# Patient Record
Sex: MALE | Race: WHITE | Employment: OTHER | URBAN - METROPOLITAN AREA
[De-identification: names, ages, dates, MRNs, and addresses within clinical notes are randomized per-mention and may not be internally consistent; named-entity substitution may affect disease eponyms.]

---

## 2018-04-20 ENCOUNTER — OFFICE VISIT (OUTPATIENT)
Dept: FAMILY MEDICINE CLINIC | Age: 68
End: 2018-04-20

## 2018-04-20 VITALS
WEIGHT: 213 LBS | HEIGHT: 75 IN | HEART RATE: 85 BPM | OXYGEN SATURATION: 98 % | TEMPERATURE: 97.6 F | RESPIRATION RATE: 18 BRPM | BODY MASS INDEX: 26.49 KG/M2 | SYSTOLIC BLOOD PRESSURE: 128 MMHG | DIASTOLIC BLOOD PRESSURE: 83 MMHG

## 2018-04-20 DIAGNOSIS — R27.0 ATAXIA: ICD-10-CM

## 2018-04-20 DIAGNOSIS — Z00.00 WELL ADULT EXAM: Primary | ICD-10-CM

## 2018-04-20 PROBLEM — E78.49 OTHER HYPERLIPIDEMIA: Status: ACTIVE | Noted: 2018-04-20

## 2018-04-20 PROBLEM — I10 ESSENTIAL HYPERTENSION: Status: ACTIVE | Noted: 2018-04-20

## 2018-04-20 PROBLEM — I48.20 CHRONIC ATRIAL FIBRILLATION (HCC): Status: ACTIVE | Noted: 2018-04-20

## 2018-04-20 RX ORDER — ALBUTEROL SULFATE 90 UG/1
2 AEROSOL, METERED RESPIRATORY (INHALATION)
COMMUNITY

## 2018-04-20 RX ORDER — PRAVASTATIN SODIUM 10 MG/1
TABLET ORAL
COMMUNITY
End: 2019-01-09 | Stop reason: SDUPTHER

## 2018-04-20 RX ORDER — IPRATROPIUM BROMIDE AND ALBUTEROL SULFATE 2.5; .5 MG/3ML; MG/3ML
3 SOLUTION RESPIRATORY (INHALATION)
COMMUNITY

## 2018-04-20 RX ORDER — METOPROLOL TARTRATE 100 MG/1
100 TABLET ORAL DAILY
COMMUNITY

## 2018-04-20 RX ORDER — ASPIRIN 81 MG/1
TABLET ORAL DAILY
COMMUNITY
End: 2018-12-31

## 2018-04-20 RX ORDER — AMLODIPINE BESYLATE 5 MG/1
5 TABLET ORAL DAILY
COMMUNITY
End: 2019-09-03 | Stop reason: SDUPTHER

## 2018-04-20 NOTE — PROGRESS NOTES
Isaac Quinonez Monday is an 74 y.o. WHITE OR  male who presents for well male exam.    Doing well. No complaints. Diet: Regular    Exercise: Outside yard work. Wife bring concern for him having a tremor for the past several months, slowly worsening. It's present in both of his hands at rest and with activity. She also notes his gait is unstable. She hasn't noticed memory issues. He denies any family history of parkinson's or tremors. He does admit at times it is hard for him to drink is coffee due to tremors. Allergies- reviewed:   No Known Allergies    Medications- reviewed:   Current Outpatient Prescriptions   Medication Sig    apixaban (ELIQUIS) 5 mg tablet Take 5 mg by mouth daily.  amLODIPine (NORVASC) 5 mg tablet Take 5 mg by mouth daily.  aspirin delayed-release 81 mg tablet Take  by mouth daily.  metoprolol tartrate (LOPRESSOR) 100 mg IR tablet Take 100 mg by mouth daily.  pravastatin (PRAVACHOL) 10 mg tablet Take  by mouth nightly.  mometasone-formoterol (DULERA) 100-5 mcg/actuation HFA inhaler Take 2 Puffs by inhalation two (2) times a day.  albuterol (PROAIR HFA) 90 mcg/actuation inhaler Take 2 Puffs by inhalation every four (4) hours as needed for Wheezing.  albuterol-ipratropium (DUO-NEB) 2.5 mg-0.5 mg/3 ml nebu 3 mL by Nebulization route. No current facility-administered medications for this visit. Past Medical History- reviewed:  Past Medical History:   Diagnosis Date    Asthma     Chronic obstructive pulmonary disease ([facility] [address]     Hypercholesterolemia        Past Surgical History- reviewed:   History reviewed. No pertinent surgical history. Family History - reviewed:  History reviewed. No pertinent family history. Social History - reviewed:  Social History     Social History    Marital status: UNKNOWN     Spouse name: N/A    Number of children: N/A    Years of education: N/A     Occupational History    Not on file.      Social History Main Topics  Smoking status: Never Smoker    Smokeless tobacco: Never Used    Alcohol use No    Drug use: No    Sexual activity: Not on file     Other Topics Concern    Not on file     Social History Narrative    No narrative on file       Immunizations - reviewed: There is no immunization history on file for this patient. Flu: Pt already got it elsewhere  Tdap: Pt states he got it last year. Pneumovax: Pt state he got it last year. Shingrix / Zostervax: Pt states he got it last year. [facility] Reviewed, grouped by patient age recommendation:    Alcohol abuse screening (CAGE): Neg. Depression screening: Neg. Diabetes screening: Ordering A1c. Colon cancer screening: Not done. Hep C screening: Will check. Consider Aspirin for CVD / CRC prevention: Pt already on ASA. Review of Systems   Review of Systems   Constitutional: Negative for chills and fever. HENT: Negative for congestion. Respiratory: Negative for cough. Cardiovascular: Negative for chest pain. Gastrointestinal: Negative for constipation and diarrhea. Skin: Negative for rash. Neurological: Negative for dizziness and headaches. Psychiatric/Behavioral: Negative for agitation. Objective:     Visit Vitals    /83 (BP 1 Location: Left arm, BP Patient Position: Sitting)    Pulse 85    Temp 97.6 °F (36.4 °C) (Oral)    Resp 18    Ht 6' 3\" (1.905 m)    Wt 213 lb (96.6 kg)    SpO2 98%    BMI 26.62 kg/m2       Physical Exam   Constitutional: He is oriented to person, place, and time. He appears well-developed and well-nourished. HENT:   Head: Normocephalic. Nose: Nose normal.   Eyes: Conjunctivae are normal.   Cardiovascular: Regular rhythm. No murmur heard. irregularly irregular   Pulmonary/Chest: Effort normal and breath sounds normal. No respiratory distress. He has no wheezes. He has no rales. Musculoskeletal: He exhibits no edema or tenderness.    Neurological: He is alert and oriented to person, place, and time. He exhibits abnormal muscle tone (mild increased tone). Coordination abnormal.   Finger to nose pt had significant difficulty with both hands. Ataxic, wide-based gait. Moderate frequency tremor at rest in both hands but even worse with purposeful movements. Skin: Skin is warm and dry. He is not diaphoretic. Vitals reviewed. Assessment / Plan:   Diagnoses and all orders for this visit:    1. Well adult exam  -     METABOLIC PANEL, COMPREHENSIVE  -     CBC WITH AUTOMATED DIFF  -     LIPID PANEL  -     HEMOGLOBIN A1C WITH EAG  -     REFERRAL FOR COLONOSCOPY  -     HEPATITIS C AB    2. Ataxia  Comments:  Ataxic gait, tremor and movement disorder at rest and with activity. Referral to neuro, checking minerals, labs. Orders:  -     REFERRAL TO NEUROLOGY  -     FERRITIN  -     TSH REFLEX TO T4  -     CERULOPLASMIN      · Counseled re: diet, exercise, healthy lifestyle. · Appropriate labs, vaccines, imaging studies, and referrals ordered as listed above    · Discussed the patient's BMI with him. The BMI follow up plan is as follows: I have counseled this patient on diet and exercise regimens. Follow-up Disposition:  Return if symptoms worsen or fail to improve. I have discussed the diagnosis with the patient and the intended plan as seen in the above orders. The patient has received an after-visit summary and questions were answered concerning future plans. I have discussed medication side effects and warnings with the patient as well. Informed pt to return to the office if new symptoms arise.     Alec Freeman MD  Family Medicine Resident

## 2018-04-20 NOTE — PATIENT INSTRUCTIONS
Benny Simmons MD  [facility], [address]  [address]  Office: [phone]    Garland Johnson MD  [facility]  [address]  [place]   Phone: [phone]  Fax: [phone]      [facility]   Dr. Josesito Paul [birth year] [address]  Phone: [phone]  Fax: [phone]    [address] [place] [address]  [address] [facility], [address] [place] [address]  Phone: [phone]  Fax: [phone]

## 2018-04-20 NOTE — PROGRESS NOTES
1. Have you been to the ER, urgent care clinic since your last visit? Hospitalized since your last visit? No    2. Have you seen or consulted any other health care providers outside of the [facility] since your last visit? Include any pap smears or colon screening. [address] Chief Complaint   Patient presents with   [address]         Patient's wife states patient has shuffling gait and tremors, but has not been diagnosed with a disorder. Patient's wife states patient also has A-fib. Blood pressure 128/83, pulse 85, temperature 97.6 °F (36.4 °C), temperature source Oral, resp. rate 18, height 6' 3\" (1.905 m), weight 213 lb (96.6 kg), SpO2 98 %.

## 2018-04-20 NOTE — MR AVS SNAPSHOT
[address] 
[phone] Patient: Ludin Peters Monday MRN: [id number] QDN:[id number] Visit Information Date & Time Provider Department Dept. Phone Encounter #  
 4/20/2018  8:50 AM Brice Powell MD [address] [place], [place] [address]ry [phone] [id number] Follow-up Instructions Return if symptoms worsen or fail to improve. Upcoming Health Maintenance Date Due Hepatitis C Screening 1950 DTaP/Tdap/Td series (1 - Tdap) 12/21/1973 FOBT Q 1 YEAR AGE 50-75 12/21/2002 ZOSTER VACCINE AGE 60> 10/21/2012 Influenza Age 5 to Adult 8/1/2017 GLAUCOMA SCREENING Q2Y 12/21/2017 Pneumococcal 65+ Low/Medium Risk (1 of 2 - PCV13) 12/21/2017 Allergies as of 4/20/2018  Review Complete On: 4/20/2018 By: Brice Powell MD  
 No Known Allergies Current Immunizations  Never Reviewed No immunizations on file. Not reviewed this visit You Were Diagnosed With   
  
 Codes Comments Well adult exam    -  Primary ICD-10-CM: Z00.00 ICD-9-CM: V70.0 Ataxia     ICD-10-CM: R27.0 ICD-9-CM: 720. 3 Ataxic gait, tremor and movement disorder at rest and with activity. Referral to neuro, checking minerals, labs. Vitals BP Pulse Temp Resp Height(growth percentile) Weight(growth percentile) 128/83 (BP 1 Location: Left arm, BP Patient Position: Sitting) 85 97.6 °F (36.4 °C) (Oral) 18 6' 3\" (1.905 m) 213 lb (96.6 kg) SpO2 BMI Smoking Status 98% 26.62 kg/m2 Never Smoker BMI and BSA Data Body Mass Index Body Surface Area  
 26.62 kg/m 2 2.26 m 2 Your Updated Medication List  
  
   
This list is accurate as of 4/20/18  9:48 AM.  Always use your most recent med list.  
  
  
  
  
 albuterol-ipratropium 2.5 mg-0.5 mg/3 ml Nebu Commonly known as:  DUO-NEB  
3 mL by Nebulization route. amLODIPine 5 mg tablet Commonly known as:  Starr Melo Take 5 mg by mouth daily. aspirin delayed-release 81 mg tablet Take  by mouth daily. DULERA 100-5 mcg/actuation HFA inhaler Generic drug:  mometasone-formoterol Take 2 Puffs by inhalation two (2) times a day. ELIQUIS 5 mg tablet Generic drug:  apixaban Take 5 mg by mouth daily. metoprolol tartrate 100 mg IR tablet Commonly known as:  LOPRESSOR Take 100 mg by mouth daily. pravastatin 10 mg tablet Commonly known as:  PRAVACHOL Take  by mouth nightly. PROAIR HFA 90 mcg/actuation inhaler Generic drug:  albuterol Take 2 Puffs by inhalation every four (4) hours as needed for Wheezing. We Performed the Following CBC WITH AUTOMATED DIFF [57461 CPT(R)] CERULOPLASMIN [id number] CPT(R)] FERRITIN [71656 CPT(R)] HEMOGLOBIN A1C WITH EAG [20492 CPT(R)] HEPATITIS C AB [51244 CPT(R)] LIPID PANEL [22015 CPT(R)] METABOLIC PANEL, COMPREHENSIVE [27600 CPT(R)] REFERRAL FOR COLONOSCOPY [HKN473 Custom] REFERRAL TO NEUROLOGY [MIU74 Custom] Comments:  
 Please evaluate patient for ataxia. TSH REFLEX TO T4 [48121 CPT(R)] Follow-up Instructions Return if symptoms worsen or fail to improve. Referral Information Referral ID Referred By Referred To  
  
 [id number] Angeles Pastor Not Available Visits Status Start Date End Date 1 New Request 4/20/18 4/20/19 If your referral has a status of pending review or denied, additional information will be sent to support the outcome of this decision. Referral ID Referred By Referred To  
 [id number] Ana [address] Phone: [phone] Fax: [phone] Visits Status Start Date End Date 1 New Request 4/20/18 4/20/19  If your referral has a status of pending review or denied, additional information will be sent to support the outcome of this decision. Patient Instructions Finn Dorsey MD 
[facility], [address] [address] Office: [phone] Meghna Harrison MD 
[facility] [address] [place] [address] Phone: [phone] Fax: [phone] [facility] Dr. Javi Paul [facility] Phone: [phone] Fax: [phone] 
 
[address] Phone: [phone] Fax: [phone] Introducing [facility]! [facility] introduces [email or web address] patient portal. Now you can access parts of your medical record, email your doctor's office, and request medication refills online. 1. In your internet browser, go to [email or web address] 2. Click on the First Time User? Click Here link in the Sign In box. You will see the New Member Sign Up page. 3. Enter your [email or web address] Access Code exactly as it appears below. You will not need to use this code after youve completed the sign-up process. If you do not sign up before the expiration date, you must request a new code. · [email or web address] Access Code: [email or web address] Expires: 7/19/2018  9:48 AM 
 
4. Enter the last four digits of your Social Security Number (xxxx) and Date of Birth (mm/dd/yyyy) as indicated and click Submit. You will be taken to the next sign-up page. 5. Create a [employer] ID. This will be your [email or web address] login ID and cannot be changed, so think of one that is secure and easy to remember. 6. Create a [employer] password. You can change your password at any time. 7. Enter your Password Reset Question and Answer. This can be used at a later time if you forget your password. 8. Enter your e-mail address. You will receive e-mail notification when new information is available in [address] 9. Click Sign Up. You can now view and download portions of your medical record. 10. Click the Download Summary menu link to download a portable copy of your medical information. If you have questions, please visit the Frequently Asked Questions section of the [employer] website. Remember, [employer] is NOT to be used for urgent needs. For medical emergencies, dial 911. Now available from your iPhone and Android! Please provide this summary of care documentation to your next provider. If you have any questions after today's visit, please call [phone].

## 2018-04-21 LAB
ALBUMIN SERPL-MCNC: 4.5 G/DL (ref 3.6–4.8)
ALBUMIN/GLOB SERPL: 1.8 {RATIO} (ref 1.2–2.2)
ALP SERPL-CCNC: 109 IU/L (ref 39–117)
ALT SERPL-CCNC: 14 IU/L (ref 0–44)
AST SERPL-CCNC: 24 IU/L (ref 0–40)
BASOPHILS # BLD AUTO: 0.1 X10E3/UL (ref 0–0.2)
BASOPHILS NFR BLD AUTO: 1 %
BILIRUB SERPL-MCNC: 0.7 MG/DL (ref 0–1.2)
BUN SERPL-MCNC: 23 MG/DL (ref 8–27)
BUN/CREAT SERPL: 19 (ref 10–24)
CALCIUM SERPL-MCNC: 9.1 MG/DL (ref 8.6–10.2)
CERULOPLASMIN SERPL-MCNC: 27.3 MG/DL (ref 16–31)
CHLORIDE SERPL-SCNC: 100 MMOL/L (ref 96–106)
CHOLEST SERPL-MCNC: 164 MG/DL (ref 100–199)
CO2 SERPL-SCNC: 24 MMOL/L (ref 18–29)
CREAT SERPL-MCNC: 1.24 MG/DL (ref 0.76–1.27)
EOSINOPHIL # BLD AUTO: 0.3 X10E3/UL (ref 0–0.4)
EOSINOPHIL NFR BLD AUTO: 5 %
ERYTHROCYTE [DISTWIDTH] IN BLOOD BY AUTOMATED COUNT: 15.5 % (ref 12.3–15.4)
EST. AVERAGE GLUCOSE BLD GHB EST-MCNC: 126 MG/DL
FERRITIN SERPL-MCNC: 340 NG/ML (ref 30–400)
GFR SERPLBLD CREATININE-BSD FMLA CKD-EPI: 61 ML/MIN/1.73
GFR SERPLBLD CREATININE-BSD FMLA CKD-EPI: 70 ML/MIN/1.73
GLOBULIN SER CALC-MCNC: 2.5 G/DL (ref 1.5–4.5)
GLUCOSE SERPL-MCNC: 97 MG/DL (ref 65–99)
HBA1C MFR BLD: 6 % (ref 4.8–5.6)
HCT VFR BLD AUTO: 42.8 % (ref 37.5–51)
HCV AB S/CO SERPL IA: <0.1 S/CO RATIO (ref 0–0.9)
HDLC SERPL-MCNC: 49 MG/DL
HGB BLD-MCNC: 14.2 G/DL (ref 13–17.7)
IMM GRANULOCYTES # BLD: 0 X10E3/UL (ref 0–0.1)
IMM GRANULOCYTES NFR BLD: 0 %
INTERPRETATION, 910389: NORMAL
LDLC SERPL CALC-MCNC: 100 MG/DL (ref 0–99)
LYMPHOCYTES # BLD AUTO: 1.7 X10E3/UL (ref 0.7–3.1)
LYMPHOCYTES NFR BLD AUTO: 28 %
MCH RBC QN AUTO: 27.3 PG (ref 26.6–33)
MCHC RBC AUTO-ENTMCNC: 33.2 G/DL (ref 31.5–35.7)
MCV RBC AUTO: 82 FL (ref 79–97)
MONOCYTES # BLD AUTO: 0.5 X10E3/UL (ref 0.1–0.9)
MONOCYTES NFR BLD AUTO: 9 %
NEUTROPHILS # BLD AUTO: 3.5 X10E3/UL (ref 1.4–7)
NEUTROPHILS NFR BLD AUTO: 57 %
PLATELET # BLD AUTO: 250 X10E3/UL (ref 150–379)
POTASSIUM SERPL-SCNC: 4.8 MMOL/L (ref 3.5–5.2)
PROT SERPL-MCNC: 7 G/DL (ref 6–8.5)
RBC # BLD AUTO: 5.21 X10E6/UL (ref 4.14–5.8)
SODIUM SERPL-SCNC: 139 MMOL/L (ref 134–144)
TRIGL SERPL-MCNC: 77 MG/DL (ref 0–149)
TSH SERPL DL<=0.005 MIU/L-ACNC: 3.1 UIU/ML (ref 0.45–4.5)
VLDLC SERPL CALC-MCNC: 15 MG/DL (ref 5–40)
WBC # BLD AUTO: 6.1 X10E3/UL (ref 3.4–10.8)

## 2018-04-24 PROBLEM — R73.03 PREDIABETES: Status: ACTIVE | Noted: 2018-04-24

## 2018-05-10 ENCOUNTER — OFFICE VISIT (OUTPATIENT)
Dept: NEUROLOGY | Age: 68
End: 2018-05-10

## 2018-05-10 ENCOUNTER — TELEPHONE (OUTPATIENT)
Dept: FAMILY MEDICINE CLINIC | Age: 68
End: 2018-05-10

## 2018-05-10 VITALS
HEART RATE: 88 BPM | WEIGHT: 215 LBS | SYSTOLIC BLOOD PRESSURE: 124 MMHG | BODY MASS INDEX: 26.73 KG/M2 | OXYGEN SATURATION: 97 % | RESPIRATION RATE: 20 BRPM | DIASTOLIC BLOOD PRESSURE: 86 MMHG | HEIGHT: 75 IN

## 2018-05-10 DIAGNOSIS — R25.9 ABNORMAL INVOLUNTARY MOVEMENT: Primary | ICD-10-CM

## 2018-05-10 DIAGNOSIS — R27.0 ATAXIA: ICD-10-CM

## 2018-05-10 NOTE — PROGRESS NOTES
Chief Complaint   Patient presents with    New Patient    Establish Care    Tremors     started a couple of yrs ago, pt's wife was concerned with Parkinson's but Dr. Fiordaliza Starks felt it may be something else    Neurologic Problem     shuffling gait, started a couple of yrs ago

## 2018-05-10 NOTE — PROGRESS NOTES
[address] [facility] [address]   [place] [address]   [place], [facility] [address]    [place]   [phone] Fax             Referring: Bo Brush MD      Chief Complaint   Patient presents with    New Patient    Establish Care    Tremors     started a couple of yrs ago, pt's wife was concerned with Parkinson's but Dr. Jose Molina felt it may be something else    Neurologic Problem     shuffling gait, started a couple of yrs ago     19-year-old right-handed gentleman who presents today accompanied by his wife for evaluation of what he calls shaking hands and shuffling gait. His wife says that his hands are shaking he is unable to hold a glass of water without spilling. She notes that they have to keep straws in the automobile because if they stop and get a soda he is unable to drink it without spilling it in the car. He notes that he sometimes spills food. He sometimes has difficulty with using a screwdriver or other tools. He notes that he believes she has had this tremor all of his life but it has become more noticeable and others are started to ask him about the shaking over the last 2 years. He has only known his wife for about a year now and she notes that she was concerned about the abnormal movements and was concerned about Parkinson's. They saw primary care who very adeptly check thyroid function was normal and also ceruloplasmin that was normal.  He was sent for evaluation. He has no shuffling gait. He has taken a fall and he says that he fell because the grass was high and he slipped. He apparently also took a fall while on a cruise ship for his honeymoon but notes that the seizures were rough and there were many people who were unable to keep her balance at that time. He denies focal weakness. Denies any spinning. Denies any visual loss although he notes his vision is poor unless he wears his glasses.   He has not had chest pain or palpitations. He does have chronic atrial fibrillation and he has been on Eliquis now for 2 years notes compliance and denies any abrupt symptoms suggestive of a cerebrovascular etiology. In addition to the Eliquis is also taking aspirin and he does deny bleeding complications. He notes that the tremor may get worse if he is anxious but sometimes not. He does not drink having previously been a drinker but stopped that several years ago. No recent infectious symptoms. No fever chills. No cough. No palpitations. Past Medical History:   Diagnosis Date    Asthma     Chronic obstructive pulmonary disease ([facility] [address]     Hypercholesterolemia        History reviewed. No pertinent surgical history. Current Outpatient Prescriptions   Medication Sig Dispense Refill    apixaban (ELIQUIS) 5 mg tablet Take 5 mg by mouth daily.  amLODIPine (NORVASC) 5 mg tablet Take 5 mg by mouth daily.  aspirin delayed-release 81 mg tablet Take  by mouth daily.  metoprolol tartrate (LOPRESSOR) 100 mg IR tablet Take 100 mg by mouth daily.  pravastatin (PRAVACHOL) 10 mg tablet Take  by mouth nightly.  mometasone-formoterol (DULERA) 100-5 mcg/actuation HFA inhaler Take 2 Puffs by inhalation two (2) times a day.  albuterol (PROAIR HFA) 90 mcg/actuation inhaler Take 2 Puffs by inhalation every four (4) hours as needed for Wheezing.  albuterol-ipratropium (DUO-NEB) 2.5 mg-0.5 mg/3 ml nebu 3 mL by Nebulization route.           No Known Allergies    Social History   Substance Use Topics    Smoking status: Never Smoker    Smokeless tobacco: Never Used    Alcohol use No       Family History   Problem Relation Age of Onset    Diabetes Mother     Hypertension Mother     Cancer Father      stomach       Review of Systems  Pertinent positives and negatives are as noted above otherwise comprehensive systems review is negative    Examination  Visit Vitals    /86 (BP 1 Location: Left arm, BP Patient Position: Sitting)    Pulse 88    Resp 20    Ht 6' 3\" (1.905 m)    Wt 97.5 kg (215 lb)    SpO2 97%    BMI 26.87 kg/m2     He is a pleasant, simplistic gentleman. He has appropriate dress and grooming. No scleral icterus. I do not hear bruit. His heart is regular. Pulses are symmetrical.    Neurologically he is awake alert and oriented. He discusses medical history. He is fluent. He has no dysarthria. He has reactive pupils bilaterally. Disc margins are not well seen. He has full versions without nystagmus and he has symmetric face and facial sensation. Tongue and palate are midline. Shoulder shrug symmetrical.  He has age-related paratonia throughout. He does have tremor which encompasses the bilateral upper extremities and at times there is a rest component particularly of the left hand greater than the right although it is present on the right. At times there is also a more random component to these abnormal movements that encompassed both the upper extremities i.e. the arm and forearm as well as the lower extremities and even the trunk at times and these movements are not choreiform and they are not myoclonic but more of a course bouncing type movement. He has no cogwheeling at the wrists. Finger tapping is slow bilaterally. He has more of an ataxic type tremor of the outstretched hands versus a postural re-emergent type tremor. He does have dysmetria on finger-nose-finger bilaterally. He resists fully in the upper and lower extremities in all muscle groups today testing. He has in addition to the dysmetria noted some intention as well. Reflexes are globally depressed and symmetrical.  Gait is steady. Sensory intact primary.     Impression/Plan  Interesting gentleman with what is described as a tremor occurring all of his life but now more prominent over the last 2 years and his wife has only known him for about a year but certainly they both believe there is been progressive worsening of the tremor and on examination the tremor itself/abnormal movements do not particularly appear characteristic of a parkinsonian tremor although there is a rest type component and as well he has some intention and he has tremor of the outstretched hands and a postural type fashion but this is coarse and more bouncing and this along with the dysmetria makes me concerned for posterior fossa abnormality particularly cerebellar versus cortical basal degeneration versus other. Review of his EMR finds he has had a normal ceruloplasmin level. Normal thyroid function. We will defer any further laboratory testing at this point. Defer any medication at this point as again tremor is not characteristic as noted. We will proceed with an MRI of the brain to specifically address the basal ganglia, cerebellum, red nucleus, posterior fossa for abnormalities that would predispose/give us an etiology. Check EEG for epileptiform and Doppler for stenosis as vascular etiology is also in the differential.  He will follow-up after testing. Jaspreet Garcia MD      This note was created using voice recognition software. Despite editing, there may be syntax errors. This note will not be viewable in [address]

## 2018-05-10 NOTE — TELEPHONE ENCOUNTER
Per call from Dr. Mario Frost office patient is there now and they need referral.   Informed office that patient will need to sign waiver if he wishes to be seen. Referral will be sent to insurance company, but will be pending authorization.

## 2018-05-10 NOTE — TELEPHONE ENCOUNTER
Jose Menendez (Whom I assume is from Dr. Luther Latif office) submitted the auth to [facility] on 5/3/18. I called [facility] upon receiving this encounter and then found the note stating the PA was already submitted. Luzma confirmed that the auth was still pending because the turn around time is 14 days. They approved this auth while I was on the phone and gave me the authorization number. I have faxed a copy to Dr. Luther Latif office and scanned in media so it is available to them in both places.

## 2018-05-10 NOTE — MR AVS SNAPSHOT
[address] [facility] [address] [facility] [address] [place] [id number] [phone] Patient: David Baird Monday MRN: [id number] MYB:[id number] Visit Information Date & Time Provider Department Dept. Phone Encounter #  
 5/10/2018  2:00 PM Humphrey Olea MD [facility] Neurology [facility] [phone] [id number] Follow-up Instructions Return for After tests. Upcoming Health Maintenance Date Due DTaP/Tdap/Td series (1 - Tdap) 12/21/1973 FOBT Q 1 YEAR AGE 50-75 12/21/2002 ZOSTER VACCINE AGE 60> 10/21/2012 GLAUCOMA SCREENING Q2Y 12/21/2017 Pneumococcal 65+ Low/Medium Risk (1 of 2 - PCV13) 12/21/2017 Influenza Age 5 to Adult 8/1/2018 Allergies as of 5/10/2018  Review Complete On: 5/10/2018 By: Camila Paige No Known Allergies Current Immunizations  Never Reviewed No immunizations on file. Not reviewed this visit You Were Diagnosed With   
  
 Codes Comments Abnormal involuntary movement    -  Primary ICD-10-CM: R25.9 ICD-9-CM: 781.0 Ataxia     ICD-10-CM: R27.0 ICD-9-CM: 445. 3 Vitals BP Pulse Resp Height(growth percentile) Weight(growth percentile) SpO2  
 124/86 (BP 1 Location: Left arm, BP Patient Position: Sitting) 88 20 6' 3\" (1.905 m) 215 lb (97.5 kg) 97% BMI Smoking Status 26.87 kg/m2 Never Smoker Vitals History BMI and BSA Data Body Mass Index Body Surface Area  
 26.87 kg/m 2 2.27 m 2 Preferred Pharmacy Pharmacy Name Phone [id number] [address] [phone] Your Updated Medication List  
  
   
This list is accurate as of 5/10/18  2:32 PM.  Always use your most recent med list.  
  
  
  
  
 albuterol-ipratropium 2.5 mg-0.5 mg/3 ml Nebu Commonly known as:  DUO-NEB  
3 mL by Nebulization route. amLODIPine 5 mg tablet Commonly known as:  Sundra Boon Take 5 mg by mouth daily. aspirin delayed-release 81 mg tablet Take  by mouth daily. DULERA 100-5 mcg/actuation HFA inhaler Generic drug:  mometasone-formoterol Take 2 Puffs by inhalation two (2) times a day. ELIQUIS 5 mg tablet Generic drug:  apixaban Take 5 mg by mouth daily. metoprolol tartrate 100 mg IR tablet Commonly known as:  LOPRESSOR Take 100 mg by mouth daily. pravastatin 10 mg tablet Commonly known as:  PRAVACHOL Take  by mouth nightly. PROAIR HFA 90 mcg/actuation inhaler Generic drug:  albuterol Take 2 Puffs by inhalation every four (4) hours as needed for Wheezing. Follow-up Instructions Return for After tests. To-Do List   
 05/10/2018 Imaging:  DUPLEX CAROTID BILATERAL AMB NEURO   
  
 05/10/2018 Neurology:  EEG [facility] NEURO   
  
 05/10/2018 Imaging:  MRI BRAIN W WO CONT Introducing [facility]! Zuly Martin introduces [employer] patient portal. Now you can access parts of your medical record, email your doctor's office, and request medication refills online. 1. In your internet browser, go to [email or web address] 2. Click on the First Time User? Click Here link in the Sign In box. You will see the New Member Sign Up page. 3. Enter your [employer] Access Code exactly as it appears below. You will not need to use this code after youve completed the sign-up process. If you do not sign up before the expiration date, you must request a new code. · [employer] Access Code: [email or web address] Expires: 7/19/2018  9:48 AM 
 
4. Enter the last four digits of your Social Security Number (xxxx) and Date of Birth (mm/dd/yyyy) as indicated and click Submit. You will be taken to the next sign-up page. 5. Create a [employer] ID. This will be your [employer] login ID and cannot be changed, so think of one that is secure and easy to remember. 6. Create a [employer] password. You can change your password at any time. 7. Enter your Password Reset Question and Answer. This can be used at a later time if you forget your password. 8. Enter your e-mail address. You will receive e-mail notification when new information is available in [address] 9. Click Sign Up. You can now view and download portions of your medical record. 10. Click the Download Summary menu link to download a portable copy of your medical information. If you have questions, please visit the Frequently Asked Questions section of the [employer] website. Remember, [employer] is NOT to be used for urgent needs. For medical emergencies, dial 911. Now available from your iPhone and Android! Please provide this summary of care documentation to your next provider. Your primary care clinician is listed as China Chan. If you have any questions after today's visit, please call [phone].

## 2018-05-14 ENCOUNTER — OFFICE VISIT (OUTPATIENT)
Dept: NEUROLOGY | Age: 68
End: 2018-05-14

## 2018-05-14 DIAGNOSIS — R25.9 ABNORMAL INVOLUNTARY MOVEMENT: Primary | ICD-10-CM

## 2018-05-14 DIAGNOSIS — R25.9 ABNORMAL INVOLUNTARY MOVEMENT: ICD-10-CM

## 2018-05-14 DIAGNOSIS — I65.23 BILATERAL CAROTID ARTERY STENOSIS: Primary | ICD-10-CM

## 2018-05-14 DIAGNOSIS — R27.0 ATAXIA: ICD-10-CM

## 2018-05-14 NOTE — PROCEDURES
Carotid Doppler:     Date:  5/14/2018     Requesting Physician:  Valentin Alicea MD     Indication:  Ataxia and stenosis     B-mode imaging reveals mixed plaque at the bifurcations extending into the proximal internal carotid artery segments bilaterally. Doppler spectral analysis reveals no significant velocity shifts. Vertebral artery flow antegrade bilaterally. Interpretation:    1. Plaque as noted. 2. Stenosis estimated at 16-49% bilateral ICA.

## 2018-05-19 ENCOUNTER — HOSPITAL ENCOUNTER (OUTPATIENT)
Dept: MRI IMAGING | Age: 68
Discharge: HOME | End: 2018-05-19
Attending: PSYCHIATRY & NEUROLOGY
Payer: MEDICARE

## 2018-05-19 DIAGNOSIS — R27.0 ATAXIA: ICD-10-CM

## 2018-05-19 DIAGNOSIS — R25.9 ABNORMAL INVOLUNTARY MOVEMENT: ICD-10-CM

## 2018-05-19 PROCEDURE — 74011250636 HC RX REV CODE- 250/636: Performed by: RADIOLOGY

## 2018-05-19 PROCEDURE — A9575 INJ GADOTERATE MEGLUMI 0.1ML: HCPCS | Performed by: RADIOLOGY

## 2018-05-19 PROCEDURE — 70553 MRI BRAIN STEM W/O & W/DYE: CPT

## 2018-05-19 RX ORDER — GADOTERATE MEGLUMINE 376.9 MG/ML
19 INJECTION INTRAVENOUS
Status: COMPLETED | OUTPATIENT
Start: 2018-05-19 | End: 2018-05-19

## 2018-05-19 RX ADMIN — GADOTERATE MEGLUMINE 19 ML: 376.9 INJECTION INTRAVENOUS at 17:38

## 2018-05-28 NOTE — PROCEDURES
EEG:      Date:  5/14/2018    Requesting Physician:  Felicia Cotto. MD [place]    An EEG is requested in this 74 y.o. with abnormal involuntary to evaluate for epileptiform abnormality. Medications:  Medications listed as Eliquis, Norvasc, lopressor, Pravachol. This tracing is obtained during the awake and drowsy states. During wakefulness there are intermittent runs of posteriorly-dominant and symmetrical low-to-medium amplitude 9 cycle per second activities which attenuate with eye opening. Lower-voltage faster-frequency activities are seen symmetrically over the anterior head regions. Hyperventilation is not performed secondary to medical history. Intermittent photic stimulation induces symmetric posterior driving responses. During drowsiness, the background rhythms attenuate and are replaced with diffuse symmetric theta range activities. Later stages of sleep are not attained. Interpretation: This EEG recorded during the awake and drowsy states is normal.  No epileptiform abnormalities are seen.

## 2018-08-09 ENCOUNTER — OFFICE VISIT (OUTPATIENT)
Dept: NEUROLOGY | Age: 68
End: 2018-08-09

## 2018-08-09 VITALS
DIASTOLIC BLOOD PRESSURE: 82 MMHG | BODY MASS INDEX: 27.1 KG/M2 | OXYGEN SATURATION: 95 % | SYSTOLIC BLOOD PRESSURE: 118 MMHG | HEART RATE: 95 BPM | TEMPERATURE: 97.5 F | HEIGHT: 75 IN | WEIGHT: 218 LBS

## 2018-08-09 DIAGNOSIS — R25.1 TREMOR OF BOTH HANDS: Primary | ICD-10-CM

## 2018-08-09 NOTE — MR AVS SNAPSHOT
[address] [place] [address] [address] [place] [address] [address] [phone] Patient: Marilu Fleming Monday MRN: [id number] MTM:61/[id number] Visit Information Date & Time Provider Department Dept. Phone Encounter #  
 8/9/2018  3:00 PM Cristina Da Silva MD [facility] Neurology [facility] [phone] [id number] Follow-up Instructions Return if symptoms worsen or fail to improve. Your Appointments 8/9/2018  3:00 PM  
Follow Up with Cristina Da Silva MD  
[facility] Appt Note: f/u from EEG, Doppler, and MRI; R/S f/u from EEG, Doppler, and MRI [facility] [address] [place] [address] [address] [phone]  
  
   
 [place] [address] [facility] [phone] [id number] [address] Upcoming Health Maintenance Date Due DTaP/Tdap/Td series (1 - Tdap) 12/21/1973 FOBT Q 1 YEAR AGE 50-75 12/21/2002 ZOSTER VACCINE AGE 60> 10/21/2012 GLAUCOMA SCREENING Q2Y 12/21/2017 Pneumococcal 65+ Low/Medium Risk (1 of 2 - PCV13) 12/21/2017 MEDICARE YEARLY EXAM 5/15/2018 Influenza Age 5 to Adult 8/1/2018 Allergies as of 8/9/2018  Review Complete On: 8/9/2018 By: Cristina Da Silva MD  
 No Known Allergies Current Immunizations  Never Reviewed No immunizations on file. Not reviewed this visit Vitals BP Pulse Temp Height(growth percentile) Weight(growth percentile) SpO2  
 118/82 95 97.5 °F (36.4 °C) 6' 3\" (1.905 m) 218 lb (98.9 kg) 95% BMI Smoking Status 27.25 kg/m2 Never Smoker BMI and BSA Data Body Mass Index Body Surface Area  
 27.25 kg/m 2 2.29 m 2 Preferred Pharmacy Pharmacy Name Phone [id number] [address] [phone] Your Updated Medication List  
  
   
This list is accurate as of 8/9/18  2:46 PM.  Always use your most recent med list.  
  
 albuterol-ipratropium 2.5 mg-0.5 mg/3 ml Nebu Commonly known as:  DUO-NEB  
3 mL by Nebulization route. amLODIPine 5 mg tablet Commonly known as:  Ny Marshall Take 5 mg by mouth daily. aspirin delayed-release 81 mg tablet Take  by mouth daily. DULERA 100-5 mcg/actuation HFA inhaler Generic drug:  mometasone-formoterol Take 2 Puffs by inhalation two (2) times a day. ELIQUIS 5 mg tablet Generic drug:  apixaban Take 5 mg by mouth daily. metoprolol tartrate 100 mg IR tablet Commonly known as:  LOPRESSOR Take 100 mg by mouth daily. pravastatin 10 mg tablet Commonly known as:  PRAVACHOL Take  by mouth nightly. PROAIR HFA 90 mcg/actuation inhaler Generic drug:  albuterol Take 2 Puffs by inhalation every four (4) hours as needed for Wheezing. Follow-up Instructions Return if symptoms worsen or fail to improve. Introducing [facility] SERVICES! [employer] introduces [employer] patient portal. Now you can access parts of your medical record, email your doctor's office, and request medication refills online. 1. In your internet browser, go to [email or web address] 2. Click on the First Time User? Click Here link in the Sign In box. You will see the New Member Sign Up page. 3. Enter your [employer] Access Code exactly as it appears below. You will not need to use this code after youve completed the sign-up process. If you do not sign up before the expiration date, you must request a new code. · [employer] Access Code: [email or web address] Expires: 11/5/2018  5:20 AM 
 
4. Enter the last four digits of your Social Security Number (xxxx) and Date of Birth (mm/dd/yyyy) as indicated and click Submit. You will be taken to the next sign-up page. 5. Create a [employer] ID. This will be your [employer] login ID and cannot be changed, so think of one that is secure and easy to remember. 6. Create a "[employer] password. You can change your password at any time. 7. Enter your Password Reset Question and Answer. This can be used at a later time if you forget your password. 8. Enter your e-mail address. You will receive e-mail notification when new information is available in [address] 9. Click Sign Up. You can now view and download portions of your medical record. 10. Click the Download Summary menu link to download a portable copy of your medical information. If you have questions, please visit the Frequently Asked Questions section of the "[employer] website. Remember, "[employer] is NOT to be used for urgent needs. For medical emergencies, dial 911. Now available from your iPhone and Android! Please provide this summary of care documentation to your next provider. Your primary care clinician is listed as Flores Ambriz. If you have any questions after today's visit, please call [phone].

## 2018-08-09 NOTE — PROGRESS NOTES
[address] [facility]   [place] [address]   [place], [address]    ZEU    Fax               Chief Complaint   Patient presents with    Tremors      follow up results      Current Outpatient Prescriptions   Medication Sig Dispense Refill    apixaban (ELIQUIS) 5 mg tablet Take 5 mg by mouth daily.  amLODIPine (NORVASC) 5 mg tablet Take 5 mg by mouth daily.  aspirin delayed-release 81 mg tablet Take  by mouth daily.  metoprolol tartrate (LOPRESSOR) 100 mg IR tablet Take 100 mg by mouth daily.  pravastatin (PRAVACHOL) 10 mg tablet Take  by mouth nightly.  mometasone-formoterol (DULERA) 100-5 mcg/actuation HFA inhaler Take 2 Puffs by inhalation two (2) times a day.  albuterol (PROAIR HFA) 90 mcg/actuation inhaler Take 2 Puffs by inhalation every four (4) hours as needed for Wheezing.  albuterol-ipratropium (DUO-NEB) 2.5 mg-0.5 mg/3 ml nebu 3 mL by Nebulization route. No Known Allergies  Social History   Substance Use Topics    Smoking status: Never Smoker    Smokeless tobacco: Never Used    Alcohol use No     Patient returns today for follow-up abnormal involuntary movements i.e. atypical type tremor. He was sent for an MRI of the brain as well as EEG have reviewed both personally and are normal.  Carotid Doppler shows 16-49% stenosis bilaterally--not considered significant. His wife accompanies him today. No change in the tremor. We reviewed his normal test results. His wife says that people have told her that he has done this type of shaking his entire life. There was another person however that thinks it just started about 2 years or so ago. He tells me that he is most bothered when he tries to use a screwdriver and he will spill food off of a fork particularly peas. He also has trouble if he is holding a glass. No rest tremor. No falls.     Examination  Visit Vitals    /82    Pulse 95  Temp 97.5 °F (36.4 °C)    Ht 6' 3\" (1.905 m)    Wt 98.9 kg (218 lb)    SpO2 95%    BMI 27.25 kg/m2   Awake alert oriented conversant. Normal speech and language. He has full versions without nystagmus. He has intention on finger-nose-finger and still some variability to the tremor although there is clearly not a rest component and he has no cogwheeling. 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 We discussed some options in terms of medication if he wanted to see if we could make this a little better and at this point he decided he would like to just take a wait and see approach and I think that is fine. Should he have any desire to try treatment he will let us know. Otherwise as needed follow-up    Ana Jacobs MD      Total time: 25 min   Counseling / coordination time: 15 min   > 50% counseling / coordination?: Yes re: as documented above      This note was created using voice recognition software. Despite editing, there may be syntax errors. This note will not be viewable in [address]

## 2018-08-23 ENCOUNTER — OFFICE VISIT (OUTPATIENT)
Dept: FAMILY MEDICINE CLINIC | Age: 68
End: 2018-08-23

## 2018-08-23 VITALS
BODY MASS INDEX: 26.36 KG/M2 | RESPIRATION RATE: 16 BRPM | TEMPERATURE: 97.6 F | WEIGHT: 212 LBS | HEART RATE: 94 BPM | DIASTOLIC BLOOD PRESSURE: 73 MMHG | SYSTOLIC BLOOD PRESSURE: 106 MMHG | OXYGEN SATURATION: 98 % | HEIGHT: 75 IN
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
## 2018-08-23 RX ORDER — CEPHALEXIN 500 MG/1
500 CAPSULE ORAL 3 TIMES DAILY
COMMUNITY
End: 2018-12-31

## 2018-08-23 NOTE — MR AVS SNAPSHOT
[address] [place] [id number] Patient: Leanne Castellanos Monday MRN: [id number] OSV:84/13/8498 Visit Information Date & Time Provider Department Dept. Phone Encounter #  
 8/23/2018 10:05 AM Ernesto Mackey MD [address] [place], [place] [address] [phone] [id number] Follow-up Instructions Return if symptoms worsen or fail to improve. Upcoming Health Maintenance Date Due DTaP/Tdap/Td series (1 - Tdap) 12/21/1973 FOBT Q 1 YEAR AGE 50-75 12/21/2002 ZOSTER VACCINE AGE 60> 10/21/2012 GLAUCOMA SCREENING Q2Y 12/21/2017 Pneumococcal 65+ Low/Medium Risk (1 of 2 - PCV13) 12/21/2017 Influenza Age 5 to Adult 8/1/2018 MEDICARE YEARLY EXAM 8/20/2018 Allergies as of 8/23/2018  Review Complete On: 8/9/2018 By: Britt Hope MD  
 No Known Allergies Current Immunizations  Never Reviewed No immunizations on file. Not reviewed this visit Vitals BP Pulse Temp Resp Height(growth percentile) Weight(growth percentile) 106/73 (BP 1 Location: Left arm, BP Patient Position: Sitting) 94 97.6 °F (36.4 °C) (Oral) 16 6' 3\" (1.905 m) 212 lb (96.2 kg) SpO2 BMI Smoking Status 98% 26.5 kg/m2 Never Smoker Vitals History BMI and BSA Data Body Mass Index Body Surface Area  
 26.5 kg/m 2 2.26 m 2 Preferred Pharmacy Pharmacy Name Phone [id number] [phone] Your Updated Medication List  
  
   
This list is accurate as of 8/23/18 10:27 AM.  Always use your most recent med list.  
  
  
  
  
 albuterol-ipratropium 2.5 mg-0.5 mg/3 ml Nebu Commonly known as:  DUO-NEB  
3 mL by Nebulization route. amLODIPine 5 mg tablet Commonly known as:  Dennise Colon Take 5 mg by mouth daily. aspirin delayed-release 81 mg tablet Take  by mouth daily. DULERA 100-5 mcg/actuation HFA inhaler Generic drug:  mometasone-formoterol Take 2 Puffs by inhalation two (2) times a day. ELIQUIS 5 mg tablet Generic drug:  apixaban Take 5 mg by mouth daily. KEFLEX 500 mg capsule Generic drug:  cephALEXin Take 500 mg by mouth three (3) times daily. metoprolol tartrate 100 mg IR tablet Commonly known as:  LOPRESSOR Take 100 mg by mouth daily. pravastatin 10 mg tablet Commonly known as:  PRAVACHOL Take  by mouth nightly. PROAIR HFA 90 mcg/actuation inhaler Generic drug:  albuterol Take 2 Puffs by inhalation every four (4) hours as needed for Wheezing. Follow-up Instructions Return if symptoms worsen or fail to improve. Patient Instructions Go directly to the ER Introducing [facility] SERVICES! [employer] introduces [employer] patient portal. Now you can access parts of your medical record, email your doctor's office, and request medication refills online. 1. In your internet browser, go to [email or web address] 2. Click on the First Time User? Click Here link in the Sign In box. You will see the New Member Sign Up page. 3. Enter your [employer] Access Code exactly as it appears below. You will not need to use this code after youve completed the sign-up process. If you do not sign up before the expiration date, you must request a new code. · [employer] Access Code: [email or web address] Expires: 11/5/2018  5:20 AM 
 
4. Enter the last four digits of your Social Security Number (xxxx) and Date of Birth (mm/dd/yyyy) as indicated and click Submit. You will be taken to the next sign-up page. 5. Create a Tangoet ID. This will be your [employer]ie login ID and cannot be changed, so think of one that is secure and easy to remember. 6. Create a [employer] password. You can change your password at any time. 7. Enter your Password Reset Question and Answer. This can be used at a later time if you forget your password. 8. Enter your e-mail address. You will receive e-mail notification when new information is available in [address] 9. Click Sign Up. You can now view and download portions of your medical record. 10. Click the Download Summary menu link to download a portable copy of your medical information. If you have questions, please visit the Frequently Asked Questions section of the [employer] website. Remember, [employer] is NOT to be used for urgent needs. For medical emergencies, dial 911. Now available from your iPhone and Android! Please provide this summary of care documentation to your next provider. Your primary care clinician is listed as Jeremy Laws. If you have any questions after today's visit, please call [phone].
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
## 2018-08-30 ENCOUNTER — OFFICE VISIT (OUTPATIENT)
Dept: FAMILY MEDICINE CLINIC | Age: 68
End: 2018-08-30

## 2018-08-30 VITALS
RESPIRATION RATE: 18 BRPM | HEART RATE: 90 BPM | HEIGHT: 75 IN | WEIGHT: 221 LBS | DIASTOLIC BLOOD PRESSURE: 92 MMHG | TEMPERATURE: 97.5 F | OXYGEN SATURATION: 97 % | BODY MASS INDEX: 27.48 KG/M2 | SYSTOLIC BLOOD PRESSURE: 132 MMHG
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
## 2018-08-30 DIAGNOSIS — Z48.02 VISIT FOR SUTURE REMOVAL: ICD-10-CM

## 2018-08-30 DIAGNOSIS — Z23 ENCOUNTER FOR IMMUNIZATION: ICD-10-CM
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
## 2018-08-30 NOTE — MR AVS SNAPSHOT
[address] 
[phone] Patient: Elle Mejia Monday MRN: [id number] WAV:[birth year] Visit Information Date & Time Provider Department Dept. Phone Encounter #  
 8/30/2018  9:30 AM Brett Henson [phone] [id number] Upcoming Health Maintenance Date Due DTaP/Tdap/Td series (1 - Tdap) 12/21/1973 FOBT Q 1 YEAR AGE 50-75 12/21/2002 ZOSTER VACCINE AGE 60> 10/21/2012 GLAUCOMA SCREENING Q2Y 12/21/2017 Pneumococcal 65+ Low/Medium Risk (1 of 2 - PCV13) 12/21/2017 Influenza Age 5 to Adult 8/1/2018 MEDICARE YEARLY EXAM 8/30/2018 Allergies as of 8/30/2018  Review Complete On: 8/30/2018 By: Sharmaine Sapp LPN No Known Allergies Current Immunizations  Never Reviewed Name Date Tdap  Incomplete Not reviewed this visit You Were Diagnosed With   
  
 Codes Comments Visit for suture removal    -  Primary ICD-10-CM: B47.90 
ICD-9-CM: V58.32 Encounter for immunization     ICD-10-CM: W93 ICD-9-CM: V03.89 Vitals BP Pulse Temp Resp Height(growth percentile) Weight(growth percentile) (!) 132/92 (BP 1 Location: Left arm, BP Patient Position: Sitting) 90 97.5 °F (36.4 °C) (Oral) 18 6' 3\" (1.905 m) 221 lb (100.2 kg) SpO2 BMI Smoking Status 97% 27.62 kg/m2 Never Smoker Vitals History BMI and BSA Data Body Mass Index Body Surface Area  
 27.62 kg/m 2 2.3 m 2 Preferred Pharmacy Pharmacy Name Phone [id number] [address] [phone] Your Updated Medication List  
  
   
This list is accurate as of 8/30/18  9:57 AM.  Always use your most recent med list.  
  
  
  
  
 albuterol-ipratropium 2.5 mg-0.5 mg/3 ml Nebu Commonly known as:  DUO-NEB  
3 mL by Nebulization route. amLODIPine 5 mg tablet Commonly known as:  Chasidy Ortiz Take 5 mg by mouth daily. aspirin delayed-release 81 mg tablet Take  by mouth daily. DULERA 100-5 mcg/actuation HFA inhaler Generic drug:  mometasone-formoterol Take 2 Puffs by inhalation two (2) times a day. ELIQUIS 5 mg tablet Generic drug:  apixaban Take 5 mg by mouth daily. KEFLEX 500 mg capsule Generic drug:  cephALEXin Take 500 mg by mouth three (3) times daily. metoprolol tartrate 100 mg IR tablet Commonly known as:  LOPRESSOR Take 100 mg by mouth daily. pravastatin 10 mg tablet Commonly known as:  PRAVACHOL Take  by mouth nightly. PROAIR HFA 90 mcg/actuation inhaler Generic drug:  albuterol Take 2 Puffs by inhalation every four (4) hours as needed for Wheezing. We Performed the Following TETANUS, DIPHTHERIA TOXOIDS AND ACELLULAR PERTUSSIS VACCINE (TDAP), IN INDIVIDS. >=7, IM [id number] CPT(R)] Patient Instructions Learning About Stitches and Staples Removal 
When are stitches and staples removed? Your doctor will tell you when to have your stitches or staples removed, usually in 7 to 14 days. How long you'll be told to wait will depend on things like where the wound is located, how big and how deep the wound is, and what your general health is like. Do not remove the stitches on your own. Stitches on the face are usually removed within a week. But stitches and staples on other areas of the body, such as on the back or belly or over a joint, may need to stay in place longer, often a week or two. Be sure to follow your doctor's instructions. How are stitches and staples removed? It usually doesn't hurt when the doctor removes the stitches or staples. You may feel a tug as each stitch or staple is removed. · You will either be seated or lying down. · To remove stitches, the doctor will use scissors to cut each of the knots and then pull the threads out. · To remove staples, the doctor will use a tool to take out the staples one at a time. · The area may still feel tender after the stitches or staples are gone. But it should feel better within a few minutes or up to a few hours. What can you expect after stitches and staples are removed? Depending on the type and location of the cut, you will have a scar. Scars usually fade over time. Keep the area clean, but you won't need a bandage. When should you call for help? Call your doctor now or seek immediate medical care if : 
· You have new pain, or your pain gets worse. · You have trouble moving the area near the scar. · You have symptoms of infection, such as: 
¨ Increased pain, swelling, warmth, or redness around the scar. ¨ Red streaks leading from the scar. ¨ Pus draining from the scar. ¨ A fever. Watch closely for changes in your health, and be sure to contact your doctor if: · The scar opens. · You do not get better as expected. Follow-up care is a key part of your treatment and safety. Be sure to make and go to all appointments, and call your doctor if you do not get better as expected. It's also a good idea to keep a list of the medicines you take. Where can you learn more? Go to http://everett-mariann.info/. Enter N379 in the search box to learn more about \"Learning About Stitches and Staples Removal.\" Current as of: November 20, 2017 Content Version: 11.7 © 0512-1880 [employer]. Care instructions adapted under license by [employer] (which disclaims liability or warranty for this information). If you have questions about a medical condition or this instruction, always ask your healthcare professional. [place] [address] any warranty or liability for your use of this information. Introducing [facility]!    
 [employer] introduces [facility] patient portal. Now you can access parts of your medical record, email your doctor's office, and request medication refills online. 1. In your internet browser, go to [email or web address] 2. Click on the First Time User? Click Here link in the Sign In box. You will see the New Member Sign Up page. 3. Enter your [place] Access Code exactly as it appears below. You will not need to use this code after youve completed the sign-up process. If you do not sign up before the expiration date, you must request a new code. · [place] Access Code: [email or web address] Expires: 11/5/2018  5:20 AM 
 
4. Enter the last four digits of your Social Security Number (xxxx) and Date of Birth (mm/dd/yyyy) as indicated and click Submit. You will be taken to the next sign-up page. 5. Create a [place] ID. This will be your [place] login ID and cannot be changed, so think of one that is secure and easy to remember. 6. Create a [place] password. You can change your password at any time. 7. Enter your Password Reset Question and Answer. This can be used at a later time if you forget your password. 8. Enter your e-mail address. You will receive e-mail notification when new information is available in [address] 9. Click Sign Up. You can now view and download portions of your medical record. 10. Click the Download Summary menu link to download a portable copy of your medical information. If you have questions, please visit the Frequently Asked Questions section of the [place]os website. Remember, [place] is NOT to be used for urgent needs. For medical emergencies, dial 911. Now available from your iPhone and Android! Please provide this summary of care documentation to your next provider. Your primary care clinician is listed as Kitty Gray. If you have any questions after today's visit, please call [phone].

## 2018-08-30 NOTE — PATIENT INSTRUCTIONS
Learning About Stitches and Staples Removal  When are stitches and staples removed? Your doctor will tell you when to have your stitches or staples removed, usually in 7 to 14 days. How long you'll be told to wait will depend on things like where the wound is located, how big and how deep the wound is, and what your general health is like. Do not remove the stitches on your own. Stitches on the face are usually removed within a week. But stitches and staples on other areas of the body, such as on the back or belly or over a joint, may need to stay in place longer, often a week or two. Be sure to follow your doctor's instructions. How are stitches and staples removed? It usually doesn't hurt when the doctor removes the stitches or staples. You may feel a tug as each stitch or staple is removed. · You will either be seated or lying down. · To remove stitches, the doctor will use scissors to cut each of the knots and then pull the threads out. · To remove staples, the doctor will use a tool to take out the staples one at a time. · The area may still feel tender after the stitches or staples are gone. But it should feel better within a few minutes or up to a few hours. What can you expect after stitches and staples are removed? Depending on the type and location of the cut, you will have a scar. Scars usually fade over time. Keep the area clean, but you won't need a bandage. When should you call for help? Call your doctor now or seek immediate medical care if :  · You have new pain, or your pain gets worse. · You have trouble moving the area near the scar. · You have symptoms of infection, such as:  ¨ Increased pain, swelling, warmth, or redness around the scar. ¨ Red streaks leading from the scar. ¨ Pus draining from the scar. ¨ A fever. Watch closely for changes in your health, and be sure to contact your doctor if:  · The scar opens. · You do not get better as expected.   Follow-up care is a key part of your treatment and safety. Be sure to make and go to all appointments, and call your doctor if you do not get better as expected. It's also a good idea to keep a list of the medicines you take. Where can you learn more? Go to http://everett-mariann.info/. Enter M106 in the search box to learn more about \"Learning About Stitches and Staples Removal.\"  Current as of: November 20, 2017  Content Version: 11.7  © 6630-1663 [employer] Inc[employer]. Care instructions adapted under license by [employer] (which disclaims liability or warranty for this information). If you have questions about a medical condition or this instruction, always ask your healthcare professional. [address] any warranty or liability for your use of this information.

## 2018-08-30 NOTE — PROGRESS NOTES
Identified Patient with two Patient identifiers (Name and ). Two Patient Identifiers confirmed. Reviewed record in preparation for visit and have obtained necessary documentation. Chief Complaint   Patient presents with    Suture Removal     Left Foot Suture Removal, states drop knife point down on left foot attempting to load         Visit Vitals    BP (!) 132/92 (BP 1 Location: Left arm, BP Patient Position: Sitting)    Pulse 90    Temp 97.5 °F (36.4 °C) (Oral)    Resp 18    Ht 6' 3\" (1.905 m)    Wt 221 lb (100.2 kg)    SpO2 97%    BMI 27.62 kg/m2       1. Have you been to the ER, urgent care clinic since your last visit? Hospitalized since your last visit? No    2. Have you seen or consulted any other health care providers outside of the [address] [place] [address] since your last visit? Include any pap smears or colon screening.  No

## 2018-09-06 ENCOUNTER — OFFICE VISIT (OUTPATIENT)
Dept: FAMILY MEDICINE CLINIC | Age: 68
End: 2018-09-06

## 2018-09-06 VITALS
OXYGEN SATURATION: 98 % | HEART RATE: 84 BPM | WEIGHT: 220 LBS | BODY MASS INDEX: 27.35 KG/M2 | SYSTOLIC BLOOD PRESSURE: 127 MMHG | RESPIRATION RATE: 16 BRPM | DIASTOLIC BLOOD PRESSURE: 85 MMHG | HEIGHT: 75 IN | TEMPERATURE: 97.8 F

## 2018-09-06 NOTE — MR AVS SNAPSHOT
[address] 
[phone] Patient: Betty Ceballos Monday MRN: [id number] ISJ:[birth year] Visit Information Date & Time Provider Department Dept. Phone Encounter #  
 9/6/2018  9:30 AM Mela Ureña, Shea Agarwal [phone] [id number] Follow-up Instructions Return in about 1 month (around 10/6/2018) for medicare physical . Upcoming Health Maintenance Date Due FOBT Q 1 YEAR AGE 50-75 12/21/2002 ZOSTER VACCINE AGE 60> 10/21/2012 GLAUCOMA SCREENING Q2Y 12/21/2017 Pneumococcal 65+ Low/Medium Risk (1 of 2 - PCV13) 12/21/2017 Influenza Age 5 to Adult 8/1/2018 MEDICARE YEARLY EXAM 8/30/2018 DTaP/Tdap/Td series (2 - Td) 8/30/2028 Allergies as of 9/6/2018  Review Complete On: 8/30/2018 By: Mela Ureña MD  
 No Known Allergies Current Immunizations  Never Reviewed Name Date Tdap 8/30/2018 Not reviewed this visit Vitals BP Pulse Temp Resp Height(growth percentile) Weight(growth percentile) 127/85 84 97.8 °F (36.6 °C) 16 6' 3\" (1.905 m) 220 lb (99.8 kg) SpO2 BMI Smoking Status 98% 27.5 kg/m2 Never Smoker BMI and BSA Data Body Mass Index Body Surface Area  
 27.5 kg/m 2 2.3 m 2 Preferred Pharmacy Pharmacy Name Phone [id number] [place], [address] [place] [address] [phone] Your Updated Medication List  
  
   
This list is accurate as of 9/6/18 10:22 AM.  Always use your most recent med list.  
  
  
  
  
 albuterol-ipratropium 2.5 mg-0.5 mg/3 ml Nebu Commonly known as:  DUO-NEB  
3 mL by Nebulization route. amLODIPine 5 mg tablet Commonly known as:  Joyce Francisco Take 5 mg by mouth daily. aspirin delayed-release 81 mg tablet Take  by mouth daily. DULERA 100-5 mcg/actuation HFA inhaler Generic drug:  mometasone-formoterol Take 2 Puffs by inhalation two (2) times a day. ELIQUIS 5 mg tablet Generic drug:  apixaban Take 5 mg by mouth daily. KEFLEX 500 mg capsule Generic drug:  cephALEXin Take 500 mg by mouth three (3) times daily. metoprolol tartrate 100 mg IR tablet Commonly known as:  LOPRESSOR Take 100 mg by mouth daily. pravastatin 10 mg tablet Commonly known as:  PRAVACHOL Take  by mouth nightly. PROAIR HFA 90 mcg/actuation inhaler Generic drug:  albuterol Take 2 Puffs by inhalation every four (4) hours as needed for Wheezing. Follow-up Instructions Return in about 1 month (around 10/6/2018) for medicare physical . Introducing [facility]! [employer] introduces [employer] patient portal. Now you can access parts of your medical record, email your doctor's office, and request medication refills online. 1. In your internet browser, go to [email or web address] 2. Click on the First Time User? Click Here link in the Sign In box. You will see the New Member Sign Up page. 3. Enter your [place] Access Code exactly as it appears below. You will not need to use this code after youve completed the sign-up process. If you do not sign up before the expiration date, you must request a new code. · [place] Access Code: [email or web address] Expires: 11/5/2018  5:20 AM 
 
4. Enter the last four digits of your Social Security Number (xxxx) and Date of Birth (mm/dd/yyyy) as indicated and click Submit. You will be taken to the next sign-up page. 5. Create a Claro Energyt ID. This will be your [place]le login ID and cannot be changed, so think of one that is secure and easy to remember. 6. Create a [place] password. You can change your password at any time. 7. Enter your Password Reset Question and Answer. This can be used at a later time if you forget your password. 8. Enter your e-mail address.  You will receive e-mail notification when new information is available in Projectioneering. 9. Click Sign Up. You can now view and download portions of your medical record. 10. Click the Download Summary menu link to download a portable copy of your medical information. If you have questions, please visit the Frequently Asked Questions section of the Projectioneering website. Remember, Pro[employer]eering is NOT to be used for urgent needs. For medical emergencies, dial 911. Now available from your iPhone and Android! Please provide this summary of care documentation to your next provider. Your primary care clinician is listed as Ashutosh Swanson. If you have any questions after today's visit, please call [phone].

## 2018-09-25 ENCOUNTER — OFFICE VISIT (OUTPATIENT)
Dept: FAMILY MEDICINE CLINIC | Age: 68
End: 2018-09-25

## 2018-09-25 VITALS
WEIGHT: 225 LBS | SYSTOLIC BLOOD PRESSURE: 129 MMHG | HEIGHT: 75 IN | RESPIRATION RATE: 18 BRPM | BODY MASS INDEX: 27.98 KG/M2 | DIASTOLIC BLOOD PRESSURE: 76 MMHG | HEART RATE: 84 BPM | TEMPERATURE: 97.8 F | OXYGEN SATURATION: 98 %

## 2018-09-25 DIAGNOSIS — B07.9 FACIAL WART: ICD-10-CM

## 2018-09-25 DIAGNOSIS — Z00.00 MEDICARE ANNUAL WELLNESS VISIT, SUBSEQUENT: Primary | ICD-10-CM

## 2018-09-25 RX ORDER — CETIRIZINE HCL 10 MG
TABLET ORAL
COMMUNITY
End: 2018-12-31

## 2018-09-25 NOTE — PROGRESS NOTES
Identified Patient with two Patient identifiers (Name and ). Two Patient Identifiers confirmed. Reviewed record in preparation for visit and have obtained necessary documentation. Chief Complaint Patient presents with [facility] Annual Wellness Visit Visit Vitals  /76 (BP 1 Location: Left arm, BP Patient Position: Sitting)  Pulse 84  Temp 97.8 °F (36.6 °C) (Oral)  Resp 18  Ht 6' 3\" (1.905 m)  Wt 225 lb (102.1 kg)  SpO2 98%  BMI 28.12 kg/m2 1. Have you been to the ER, urgent care clinic since your last visit? Hospitalized since your last visit? NO 
 
2. Have you seen or consulted any other health care providers outside of the [address] [place] [address] since your last visit? Include any pap smears or colon screening.  NO

## 2018-09-25 NOTE — PATIENT INSTRUCTIONS
Medicare Wellness Visit, Male The best way to live healthy is to have a lifestyle where you eat a well-balanced diet, exercise regularly, limit alcohol use, and quit all forms of tobacco/nicotine, if applicable. Regular preventive services are another way to keep healthy. Preventive services (vaccines, screening tests, monitoring & exams) can help personalize your care plan, which helps you manage your own care. Screening tests can find health problems at the earliest stages, when they are easiest to treat. [facility] follows the current, evidence-based guidelines published by the [facility] ([facility]) when recommending preventive services for our patients. Because we follow these guidelines, sometimes recommendations change over time as research supports it. (For example, a prostate screening blood test is no longer routinely recommended for men with no symptoms.) Of course, you and your doctor may decide to screen more often for some diseases, based on your risk and co-morbidities (chronic disease you are already diagnosed with). Preventive services for you include: - Medicare offers their members a free annual wellness visit, which is time for you and your primary care provider to discuss and plan for your preventive service needs. Take advantage of this benefit every year! 
-All adults over age 72 should receive the recommended pneumonia vaccines. Current USPSTF guidelines recommend a series of two vaccines for the best pneumonia protection.  
-All adults should have a flu vaccine yearly and an ECG.  All adults age 61 and older should receive a shingles vaccine once in their lifetime.   
-All adults age 38-68 who are overweight should have a diabetes screening test once every three years.  
-Other screening tests & preventive services for persons with diabetes include: an eye exam to screen for diabetic retinopathy, a kidney function test, a foot exam, and stricter control over your cholesterol.  
-Cardiovascular screening for adults with routine risk involves an electrocardiogram (ECG) at intervals determined by the provider.  
-Colorectal cancer screening should be done for adults age 54-65 with no increased risk factors for colorectal cancer. There are a number of acceptable methods of screening for this type of cancer. Each test has its own benefits and drawbacks. Discuss with your provider what is most appropriate for you during your annual wellness visit. The different tests include: colonoscopy (considered the best screening method), a fecal occult blood test, a fecal DNA test, and sigmoidoscopy. 
-All adults born between [facility] should be screened once for Hepatitis C. 
-An Abdominal Aortic Aneurysm (AAA) Screening is recommended for men age 73-68 who has ever smoked in their lifetime. Here is a list of your current Health Maintenance items (your personalized list of preventive services) with a due date: 
Health Maintenance Due Topic Date Due  Shingles Vaccine (1 of 2) 12/21/2002  Stool testing for trace blood  12/21/2002  Glaucoma Screening   12/21/2017  Pneumococcal Vaccine (1 of 2 - PCV13) 12/21/2017  Flu Vaccine  08/01/2018 [facility] Annual Well Visit  09/23/2018 Jude Terry MD 
[facility] [address] [place] [address] Phone: [phone] Fax: [phone] Adriano Hutchinson MD 
[facility], [address] [place] [address] Office: [phone]

## 2018-09-25 NOTE — MR AVS SNAPSHOT
[facility] [address] [employer] 
[phone] Patient: Mary Cueto Monday MRN: [id number] Visit Information Date & Time Provider Department Dept. Phone Encounter #  
 9/25/2018 11:15 AM Jamilah Tao MD [address] [place] [address] [phone] [id number] Your Appointments 9/25/2018 11:15 AM  
ROUTINE CARE with Jamilah Tao MD  
[address] [place] [address] [facility]: 2 wks f/up from last visit(per pt's wife) $0CP yd 09/15/18  
 [address] [place] [address],[address] [facility]  
[phone]  
  
   
 [address] [place] [address],[phone]  
  
    
 9/27/2018  8:20 AM  
Medicare Physical with Jamilah Tao MD  
[address] [place] [address] [facility] Appt Note: MCR  
 [address] [place] [address],[address] [facility]  
[phone]  
  
   
 [address] [place] [address],[phone] Upcoming Health Maintenance Date Due Shingrix Vaccine Age 50> (1 of 2) 12/21/2002 FOBT Q 1 YEAR AGE 50-75 12/21/2002 GLAUCOMA SCREENING Q2Y 12/21/2017 Pneumococcal 65+ Low/Medium Risk (1 of 2 - PCV13) 12/21/2017 Influenza Age 5 to Adult 8/1/2018 MEDICARE YEARLY EXAM 9/23/2018 DTaP/Tdap/Td series (2 - Td) 8/30/2028 Allergies as of 9/25/2018  Review Complete On: 9/25/2018 By: Jani Hernandez LPN No Known Allergies Current Immunizations  Never Reviewed Name Date Tdap 8/30/2018 Not reviewed this visit You Were Diagnosed With   
  
 Codes Comments Medicare annual wellness visit, subsequent    -  Primary ICD-10-CM: Z00.00 ICD-9-CM: V70.0 Vitals BP Pulse Temp Resp Height(growth percentile) Weight(growth percentile) 129/76 (BP 1 Location: Left arm, BP Patient Position: Sitting) 84 97.8 °F (36.6 °C) (Oral) 18 6' 3\" (1.905 m) 225 lb (102.1 kg) SpO2 BMI Smoking Status 98% 28.12 kg/m2 Never Smoker Vitals History BMI and BSA Data Body Mass Index Body Surface Area  
 28.12 kg/m 2 2.32 m 2 Preferred Pharmacy Pharmacy Name Phone [id number]- [address] [phone] Your Updated Medication List  
  
   
This list is accurate as of 18 11:02 AM.  Always use your most recent med list.  
  
  
  
  
 albuterol-ipratropium 2.5 mg-0.5 mg/3 ml Nebu Commonly known as:  DUO-NEB  
3 mL by Nebulization route. amLODIPine 5 mg tablet Commonly known as:  Horris Bills Take 5 mg by mouth daily. aspirin delayed-release 81 mg tablet Take  by mouth daily. cetirizine 10 mg tablet Commonly known as:  ZYRTEC Take  by mouth. DULERA 100-5 mcg/actuation HFA inhaler Generic drug:  mometasone-formoterol Take 2 Puffs by inhalation two (2) times a day. ELIQUIS 5 mg tablet Generic drug:  apixaban Take 5 mg by mouth daily. KEFLEX 500 mg capsule Generic drug:  cephALEXin Take 500 mg by mouth three (3) times daily. metoprolol tartrate 100 mg IR tablet Commonly known as:  LOPRESSOR Take 100 mg by mouth daily. pneumococcal 13 colin conj dip 0.5 mL Syrg injection Commonly known as:  PREVNAR 13 (PF)  
0.5 mL by IntraMUSCular route once for 1 dose. pravastatin 10 mg tablet Commonly known as:  PRAVACHOL Take  by mouth nightly. PROAIR HFA 90 mcg/actuation inhaler Generic drug:  albuterol Take 2 Puffs by inhalation every four (4) hours as needed for Wheezing. varicella zoster vaccine live 19,400 unit/0.65 mL Susr injection Commonly known as:  ZOSTAVAX  
1 Vial by SubCUTAneous route once for 1 dose. Prescriptions Printed Refills  
 pneumococcal 13 colin conj dip (PREVNAR 13, PF,) 0.5 mL syrg injection 0 Si.5 mL by IntraMUSCular route once for 1 dose. Class: Print Route: IntraMUSCular We Performed the Following REFERRAL TO DERMATOLOGY [REF19 Custom] REFERRAL TO GASTROENTEROLOGY [BCT99 Custom] Comments:  
 Please see for screening colonoscopy Referral Information Referral ID Referred By Referred To  
  
 [id number] [address] [place] [address] 173 08Slick Cash [facility] Phone: [phone] Fax: [phone] Visits Status Start Date End Date 1 New Request 9/25/18 9/25/19 If your referral has a status of pending review or denied, additional information will be sent to support the outcome of this decision. Referral ID Referred By Referred To  
 [id number] Hardik Langston Not Available Visits Status Start Date End Date 1 New Request 9/25/18 9/25/19 If your referral has a status of pending review or denied, additional information will be sent to support the outcome of this decision. Patient Instructions Medicare Wellness Visit, Male The best way to live healthy is to have a lifestyle where you eat a well-balanced diet, exercise regularly, limit alcohol use, and quit all forms of tobacco/nicotine, if applicable. Regular preventive services are another way to keep healthy. Preventive services (vaccines, screening tests, monitoring & exams) can help personalize your care plan, which helps you manage your own care. Screening tests can find health problems at the earliest stages, when they are easiest to treat. [address] [facility] follows the current, evidence-based guidelines published by the [facility] (USPSTF) when recommending preventive services for our patients. Because we follow these guidelines, sometimes recommendations change over time as research supports it. (For example, a prostate screening blood test is no longer routinely recommended for men with no symptoms.) Of course, you and your doctor may decide to screen more often for some diseases, based on your risk and co-morbidities (chronic disease you are already diagnosed with). Preventive services for you include: - Medicare offers their members a free annual wellness visit, which is time for you and your primary care provider to discuss and plan for your preventive service needs. Take advantage of this benefit every year! 
-All adults over age 72 should receive the recommended pneumonia vaccines. Current USPSTF guidelines recommend a series of two vaccines for the best pneumonia protection.  
-All adults should have a flu vaccine yearly and an ECG. All adults age 61 and older should receive a shingles vaccine once in their lifetime.   
-All adults age 38-68 who are overweight should have a diabetes screening test once every three years.  
-Other screening tests & preventive services for persons with diabetes include: an eye exam to screen for diabetic retinopathy, a kidney function test, a foot exam, and stricter control over your cholesterol.  
-Cardiovascular screening for adults with routine risk involves an electrocardiogram (ECG) at intervals determined by the provider.  
-Colorectal cancer screening should be done for adults age 54-65 with no increased risk factors for colorectal cancer. There are a number of acceptable methods of screening for this type of cancer. Each test has its own benefits and drawbacks. Discuss with your provider what is most appropriate for you during your annual wellness visit. The different tests include: colonoscopy (considered the best screening method), a fecal occult blood test, a fecal DNA test, and sigmoidoscopy. 
-All adults born between [facility] should be screened once for Hepatitis C. 
-An Abdominal Aortic Aneurysm (AAA) Screening is recommended for men age 73-68 who has ever smoked in their lifetime. Here is a list of your current Health Maintenance items (your personalized list of preventive services) with a due date: Health Maintenance Due Topic Date Due  Shingles Vaccine (1 of 2) 12/21/2002  Stool testing for trace blood  12/21/2002  Glaucoma Screening   12/21/2017  Pneumococcal Vaccine (1 of 2 - PCV13) 12/21/2017  Flu Vaccine  08/01/2018 [employer] Annual Well Visit  09/23/2018 Yohannes Montoya MD 
[facility] [address] Phone: [phone] Fax: [phone] Viridiana Covarrubias MD 
[facility], [address] [address] Office: [phone] Introducing [facility]! Renuka Burger introduces ScreenTag patient portal. Now you can access parts of your medical record, email your doctor's office, and request medication refills online. 1. In your internet browser, go to [email or web address]. [employer] 2. Click on the First Time User? Click Here link in the Sign In box. You will see the New Member Sign Up page. 3. Enter your ScreenTag Access Code exactly as it appears below. You will not need to use this code after youve completed the sign-up process. If you do not sign up before the expiration date, you must request a new code. · ScreenTag Access Code: [email or web address] Expires: 11/5/2018  5:20 AM 
 
4. Enter the last four digits of your Social Security Number (xxxx) and Date of Birth (mm/dd/yyyy) as indicated and click Submit. You will be taken to the next sign-up page. 5. Create a "[employer]t ID. This will be your ScreenTag login ID and cannot be changed, so think of one that is secure and easy to remember. 6. Create a ScreenTag password. You can change your password at any time. 7. Enter your Password Reset Question and Answer. This can be used at a later time if you forget your password. 8. Enter your e-mail address. You will receive e-mail notification when new information is available in [address] 9. Click Sign Up. You can now view and download portions of your medical record. 10. Click the Download Summary menu link to download a portable copy of your medical information. If you have questions, please visit the Frequently Asked Questions section of the TagMan website. Remember, TagMan is NOT to be used for urgent needs. For medical emergencies, dial 911. Now available from your iPhone and Android! Please provide this summary of care documentation to your next provider. Your primary care clinician is listed as Carmita Mahmood. If you have any questions after today's visit, please call [phone].

## 2018-09-25 NOTE — PROGRESS NOTES
Date of visit: 9/25/2018 This is a Subsequent Medicare Annual Wellness Visit (AWV), 
I have reviewed the patient's medical history in detail and updated the computerized patient record. Candis Slater Monday is a 74 y.o. male History obtained from: the patient and wife Concerns today  
 
-Has lesions under his right eye for a long time, would like to be excised History Patient Active Problem List  
Diagnosis Code  Chronic atrial fibrillation (HCC) I48.2  Essential hypertension I10  
 Other hyperlipidemia E78.4  Prediabetes R73.03  
 Tremor of both hands R25.1 Past Medical History:  
Diagnosis Date  Asthma  Chronic obstructive pulmonary disease ([facility] [address]  Hypercholesterolemia No past surgical history on file. No Known Allergies Current Outpatient Prescriptions Medication Sig Dispense Refill  cetirizine (ZYRTEC) 10 mg tablet Take  by mouth.  pneumococcal 13 colin conj dip (PREVNAR 13, PF,) 0.5 mL syrg injection 0.5 mL by IntraMUSCular route once for 1 dose. 0.5 mL 0  
 varicella zoster vaccine live (ZOSTAVAX) 19,400 unit/0.65 mL susr injection 1 Vial by SubCUTAneous route once for 1 dose. 0.65 mL 0  
 apixaban (ELIQUIS) 5 mg tablet Take 5 mg by mouth daily.  amLODIPine (NORVASC) 5 mg tablet Take 5 mg by mouth daily.  aspirin delayed-release 81 mg tablet Take  by mouth daily.  metoprolol tartrate (LOPRESSOR) 100 mg IR tablet Take 100 mg by mouth daily.  pravastatin (PRAVACHOL) 10 mg tablet Take  by mouth nightly.  mometasone-formoterol (DULERA) 100-5 mcg/actuation HFA inhaler Take 2 Puffs by inhalation two (2) times a day.  albuterol (PROAIR HFA) 90 mcg/actuation inhaler Take 2 Puffs by inhalation every four (4) hours as needed for Wheezing.  albuterol-ipratropium (DUO-NEB) 2.5 mg-0.5 mg/3 ml nebu 3 mL by Nebulization route.  cephALEXin (KEFLEX) 500 mg capsule Take 500 mg by mouth three (3) times daily. Family History Problem Relation Age of Onset  Diabetes Mother  Hypertension Mother  Cancer Father   
  stomach Social History Substance Use Topics  Smoking status: Never Smoker  Smokeless tobacco: Never Used  Alcohol use No  
 
 
Specialists/Care Team  
Tereso Chaves Monday has established care with the following healthcare providers: 
Patient Care Team: 
Uzma Klein MD as PCP - [facility]) Geoffrey Cheung MD (Neurology) Dr Maria Dolores Douglas- Cardiology Health Risk Assessment Demographics  
male 74 y.o. General Health Questions  
-During the past 4 weeks: 
 -how would you rate your health in general? Good 
 -how often have you been bothered by feeling dizzy when standing up? never -how much have you been bothered by bodily pain? not 
 -Have you noticed any hearing difficulties? no 
 -has your physical and emotional health limited your social activities with family or friends? no 
 
Emotional Health Questions  
-Do you have a history of depression, anxiety, or emotional problems? no 
-Over the past 2 weeks, have you felt down, depressed or hopeless? no 
-Over the past 2 weeks, have you felt little interest or pleasure in doing things? no 
 
Health Habits Please describe your diet habits: well balanced diet Do you get 5 servings of fruits or vegetables daily? yes Do you exercise regularly? yes Activities of Daily Living and Functional Status  
-Do you need help with eating, walking, dressing, bathing, toileting, the phone, transportation, shopping, preparing meals, housework, laundry, medications or managing money? no 
-In the past four weeks, was someone available to help you if you needed and wanted help with anything? yes 
-Are you confident are you that you can control and manage most of your health problems? yes 
-Have you been given information to help you keep track of your medications? yes -How often do you have trouble taking your medications as prescribed? never Fall Risk and Home Safety Have you fallen 2 or more times in the past year? no 
Does your home have rugs in the hallway, lack grab bars in the bathroom, lack handrails on the stairs or have poor lighting? no 
Do you have smoke detectors and check them regularly? no 
Do you have difficulties driving a car? yes Do you always fasten your seat belt when you are in a car? no 
 
Review of Systems (if indicated for problems addressed today) Physical Examination Vitals:  
 09/25/18 1041 BP: 129/76 Pulse: 84 Resp: 18 Temp: 97.8 °F (36.6 °C) TempSrc: Oral  
SpO2: 98% Weight: 225 lb (102.1 kg) Height: 6' 3\" (1.905 m) Body mass index is 28.12 kg/(m^2). No exam data present Was the patient's timed Up & Go test unsteady or longer than 30 seconds? no 
 
Evaluation of Cognitive Function Mood/affect:  neutral 
Orientation: Person, Place, Time and Situation Appearance: age appropriate Family member/caregiver input: As above Advice/Referrals/Counseling (as indicated) Education and counseling provided for any problems identified above:  
 
Preventive Services Health Maintenance Topic Date Due  Shingrix Vaccine Age 50> (1 of 2) 12/21/2002  FOBT Q 1 YEAR AGE 50-75  12/21/2002  GLAUCOMA SCREENING Q2Y  12/21/2017  Pneumococcal 65+ Low/Medium Risk (1 of 2 - PCV13) 12/21/2017  Influenza Age 5 to Adult  08/01/2018  MEDICARE YEARLY EXAM  09/23/2018  DTaP/Tdap/Td series (2 - Td) 08/30/2028  Hepatitis C Screening  Completed (Preventive care checklist to be included in patient instructions) Discussed today Done Previously Not Needed   
x   Pneumococcal vaccines  
 x  Flu vaccine Hepatitis B vaccine (if at risk) x   Shingles vaccine  
 x  TDAP vaccine Digital rectal exam  
x   PSA  
x   Colorectal cancer screening  
  x Low-dose CT for lung cancer screening Bone density test  
x   Glaucoma screening  
 x  Cholesterol test  
 x  Diabetes screening test   
  x Diabetes self-management class  
  x Nutritionist referral for diabetes or renal disease Discussion of Advance Directive Discussed with Jony Lenore Monday his ability to prepare and advance directive in the case that an injury or illness causes him to be unable to make health care decisions. Assessment/Plan 75 yo male who comes in for annual medicare visit ICD-10-CM ICD-9-CM 1. Medicare annual wellness visit, subsequent Z00.00 V70.0 pneumococcal 13 colin conj dip (PREVNAR 13, PF,) 0.5 mL syrg injection  
   varicella zoster vaccine live (ZOSTAVAX) 19,400 unit/0.65 mL susr injection REFERRAL TO GASTROENTEROLOGY 2. Facial wart B07.9 078.10 REFERRAL TO DERMATOLOGY  
 
 
- Referral to GI, Dr Zeny Gunter for screening colonoscopy - Follow up with Opthalmology for glaucoma screening - Received Shingrix vaccine today - Provided script for PCV 13  
- Has already received Flu shot - Counseled on diet and exercise - Referral to Dermatology for excision of warts Orders Placed This Encounter  ZOSTER VACC RECOMBINANT ADJUVANTED  Referral to Gastroenterology  Referral to Dermatology  cetirizine (ZYRTEC) 10 mg tablet  pneumococcal 13 colin conj dip (PREVNAR 13, PF,) 0.5 mL syrg injection  varicella zoster vaccine live (ZOSTAVAX) 19,400 unit/0.65 mL susr injection Follow-up Disposition: Not on File Nancy Bond MD

## 2018-12-31 ENCOUNTER — OFFICE VISIT (OUTPATIENT)
Dept: FAMILY MEDICINE CLINIC | Age: 68
End: 2018-12-31

## 2018-12-31 VITALS
HEART RATE: 99 BPM | TEMPERATURE: 97.7 F | HEIGHT: 75 IN | OXYGEN SATURATION: 98 % | DIASTOLIC BLOOD PRESSURE: 81 MMHG | SYSTOLIC BLOOD PRESSURE: 123 MMHG | WEIGHT: 222 LBS | BODY MASS INDEX: 27.6 KG/M2 | RESPIRATION RATE: 17 BRPM

## 2018-12-31 DIAGNOSIS — J44.1 COPD EXACERBATION (HCC): Primary | ICD-10-CM

## 2018-12-31 DIAGNOSIS — H61.21 IMPACTED CERUMEN OF RIGHT EAR: ICD-10-CM

## 2018-12-31 PROBLEM — J41.0 SIMPLE CHRONIC BRONCHITIS (HCC): Chronic | Status: ACTIVE | Noted: 2018-12-31

## 2018-12-31 RX ORDER — ALBUTEROL SULFATE 0.83 MG/ML
2.5 SOLUTION RESPIRATORY (INHALATION) ONCE
Qty: 1 EACH | Refills: 0
Start: 2018-12-31 | End: 2018-12-31

## 2018-12-31 RX ORDER — AMOXICILLIN AND CLAVULANATE POTASSIUM 875; 125 MG/1; MG/1
1 TABLET, FILM COATED ORAL EVERY 12 HOURS
Qty: 14 TAB | Refills: 0 | Status: SHIPPED | OUTPATIENT
Start: 2018-12-31 | End: 2019-01-07

## 2018-12-31 RX ORDER — PREDNISONE 20 MG/1
40 TABLET ORAL
Qty: 10 TAB | Refills: 0 | Status: SHIPPED | OUTPATIENT
Start: 2018-12-31 | End: 2019-01-05

## 2018-12-31 NOTE — PROGRESS NOTES
Chief Complaint   Patient presents with    Cough     x 1 week      Blood pressure 123/81, pulse 99, temperature 97.7 °F (36.5 °C), temperature source Oral, resp. rate 17, height 6' 3\" (1.905 m), weight 222 lb (100.7 kg), SpO2 98 %. 1. Have you been to the ER, urgent care clinic since your last visit? Hospitalized since your last visit? No    2. Have you seen or consulted any other health care providers outside of the [address] [place] [address] since your last visit? Include any pap smears or colon screening.  No

## 2018-12-31 NOTE — PROGRESS NOTES
Subjective:   Gallito Singh Monday is a 79 y.o. male with history of COPD/Asthma, HTN, HLD  CC: Cough   History provided by patient and Records    HPI:    COPD-  With acute exacerbation. Patient was started on Breathing treatments several years ago with previous provider. Never had breathing studies done per patient. All diagnoses COPD. Oxygen: He currently is not on home oxygen therapy. Symptoms: acute dyspnea: severity = mild: course since onset of episode: stable  chronic dyspnea: severity = mild: course of sx: stable  cough: severity: moderate: sputum : yellow: light. Not significantly increased from normal.  Patient has been using Breathing treatments every 3-4 hours. Last used about 6 am today. Patient does not smoke cigarettes. Wife has URI currently  The patient is compliant with treatment plan. PFSH:     Current Outpatient Medications on File Prior to Visit   Medication Sig Dispense Refill    apixaban (ELIQUIS) 5 mg tablet Take 5 mg by mouth daily.  amLODIPine (NORVASC) 5 mg tablet Take 5 mg by mouth daily.  metoprolol tartrate (LOPRESSOR) 100 mg IR tablet Take 100 mg by mouth daily.  pravastatin (PRAVACHOL) 10 mg tablet Take  by mouth nightly.  mometasone-formoterol (DULERA) 100-5 mcg/actuation HFA inhaler Take 2 Puffs by inhalation two (2) times a day.  albuterol (PROAIR HFA) 90 mcg/actuation inhaler Take 2 Puffs by inhalation every four (4) hours as needed for Wheezing.  albuterol-ipratropium (DUO-NEB) 2.5 mg-0.5 mg/3 ml nebu 3 mL by Nebulization route.  cetirizine (ZYRTEC) 10 mg tablet Take  by mouth.  cephALEXin (KEFLEX) 500 mg capsule Take 500 mg by mouth three (3) times daily.  aspirin delayed-release 81 mg tablet Take  by mouth daily. No current facility-administered medications on file prior to visit.         Patient Active Problem List   Diagnosis Code    Chronic atrial fibrillation ([facility] [address] I48.2    Essential hypertension I10    Other hyperlipidemia E78.49    Prediabetes R73.03    Tremor of both hands R25.1    Simple chronic bronchitis (HCC) J41.0       Social History     Socioeconomic History    Marital status:      Spouse name: Not on file    Number of children: Not on file    Years of education: Not on file    Highest education level: Not on file   Social Needs    Financial resource strain: Not on file    Food insecurity - worry: Not on file    Food insecurity - inability: Not on file    Transportation needs - medical: Not on file   [employer] needs - non-medical: Not on file   Occupational History    Not on file   Tobacco Use    Smoking status: Never Smoker    Smokeless tobacco: Never Used   Substance and Sexual Activity    Alcohol use: No    Drug use: No    Sexual activity: Not on file   Other Topics Concern    Not on file   Social History Narrative    Not on file       ROS      Objective:     Visit Vitals  /81   Pulse 99   Temp 97.7 °F (36.5 °C) (Oral)   Resp 17   Ht 6' 3\" (1.905 m)   Wt 222 lb (100.7 kg)   SpO2 98%   BMI 27.75 kg/m²          Physical Exam   Constitutional: He is oriented to person, place, and time. He appears well-developed and well-nourished. No distress. HENT:   Head: Normocephalic and atraumatic. Clear TM on the left, impacted Cerumen on the right ear. Rhinorrhea. No Sinus tenderness. Neck: Normal range of motion. Neck supple. Cardiovascular: Normal rate, regular rhythm, normal heart sounds and intact distal pulses. Exam reveals no gallop and no friction rub. No murmur heard. Pulmonary/Chest: Effort normal and breath sounds normal. No respiratory distress. Moderate air movement, small wheezing. Post Albuterol treatment mildly improved air movement and increased bibasilar wheezing. Breathing normally, no retractions. Abdominal: Soft. Bowel sounds are normal. He exhibits no distension. There is no tenderness. Musculoskeletal: Normal range of motion. Neurological: He is alert and oriented to person, place, and time. Skin: Skin is warm and dry. Nursing note and vitals reviewed. Pertinent Labs/Studies:  Spirometry Pre Albuterol: 100-150   Spirometry post albuterol: 100-120    Assessment and orders:       ICD-10-CM ICD-9-CM    1. COPD exacerbation ([facility]) J44.1 491.21 predniSONE (DELTASONE) 20 mg tablet      amoxicillin-clavulanate (AUGMENTIN) 875-125 mg per tablet      albuterol (PROVENTIL VENTOLIN) 2.5 mg /3 mL (0.083 %) nebulizer solution      ALBUTEROL, INHAL. SOL., FDA-APPROVED FINAL, NON-COMPOUND UNIT DOSE, 1 MG      INHAL RX, AIRWAY OBST/DX SPUTUM INDUCT   2. Impacted cerumen of right ear H61.21 380.4      Diagnoses and all orders for this visit:    1. COPD exacerbation ([facility] [address]): New cough with increased purulence and dyspnea with activity. Responding to breathing treatments but not improving. Steroid Burst and Augmentin now. Recommending follow up and spirometry testing here or with Pulmonology. -     predniSONE (DELTASONE) 20 mg tablet; Take 40 mg by mouth daily (with breakfast) for 5 days. -     amoxicillin-clavulanate (AUGMENTIN) 875-125 mg per tablet; Take 1 Tab by mouth every twelve (12) hours for 7 days. -     albuterol (PROVENTIL VENTOLIN) 2.5 mg /3 mL (0.083 %) nebulizer solution; 3 mL by Nebulization route once for 1 dose. -     ALBUTEROL, INHAL. SOL., FDA-APPROVED FINAL, NON-COMPOUND UNIT DOSE, 1 MG  -     INHAL RX, AIRWAY OBST/DX SPUTUM INDUCT    2. Impacted cerumen of right ear: Recommending       Follow-up Disposition:  Return in about 1 month (around 1/31/2019) for Follow up COPD. I have discussed the diagnosis with the patient and the intended plan as seen in the above orders. Social history, medical history, and labs were reviewed. The patient has received an after-visit summary and questions were answered concerning future plans. I have discussed medication side effects and warnings with the patient as well.     Sasha Torre Andrzej Webb MD  Resident [facility]  12/31/18    Patient discussed with Dr. Chapincito Bello, Attending Physician

## 2018-12-31 NOTE — PATIENT INSTRUCTIONS
Start using debrox drops in the right ear daily. Chronic Obstructive Pulmonary Disease (COPD): Care Instructions  Your Care Instructions    Chronic obstructive pulmonary disease (COPD) is a general term for a group of lung diseases, including emphysema and chronic bronchitis. People with COPD have decreased airflow in and out of the lungs, which makes it hard to breathe. The airways also can get clogged with thick mucus. Cigarette smoking is a major cause of COPD. Although there is no cure for COPD, you can slow its progress. Following your treatment plan and taking care of yourself can help you feel better and live longer. Follow-up care is a key part of your treatment and safety. Be sure to make and go to all appointments, and call your doctor if you are having problems. It's also a good idea to know your test results and keep a list of the medicines you take. How can you care for yourself at home?   Staying healthy    · Do not smoke. This is the most important step you can take to prevent more damage to your lungs. If you need help quitting, talk to your doctor about stop-smoking programs and medicines. These can increase your chances of quitting for good.     · Avoid colds and flu. Get a pneumococcal vaccine shot. If you have had one before, ask your doctor whether you need a second dose. Get the flu vaccine every fall. If you must be around people with colds or the flu, wash your hands often.     · Avoid secondhand smoke, air pollution, and high altitudes. Also avoid cold, dry air and hot, humid air. Stay at home with your windows closed when air pollution is bad.    Medicines and oxygen therapy    · Take your medicines exactly as prescribed. Call your doctor if you think you are having a problem with your medicine.     · You may be taking medicines such as:  ? Bronchodilators. These help open your airways and make breathing easier.  Bronchodilators are either short-acting (work for 6 to 9 hours) or long-acting (work for 24 hours). You inhale most bronchodilators, so they start to act quickly. Always carry your quick-relief inhaler with you in case you need it while you are away from home. ? Corticosteroids (prednisone, budesonide). These reduce airway inflammation. They come in pill or inhaled form. You must take these medicines every day for them to work well.     · A spacer may help you get more inhaled medicine to your lungs. Ask your doctor or pharmacist if a spacer is right for you. If it is, ask how to use it properly.     · Do not take any vitamins, over-the-counter medicine, or herbal products without talking to your doctor first.     · If your doctor prescribed antibiotics, take them as directed. Do not stop taking them just because you feel better. You need to take the full course of antibiotics.     · Oxygen therapy boosts the amount of oxygen in your blood and helps you breathe easier. Use the flow rate your doctor has recommended, and do not change it without talking to your doctor first.   Activity    · Get regular exercise. Walking is an easy way to get exercise. Start out slowly, and walk a little more each day.     · Pay attention to your breathing. You are exercising too hard if you cannot talk while you are exercising.     · Take short rest breaks when doing household chores and other activities.     · Learn breathing methods--such as breathing through pursed lips--to help you become less short of breath.     · If your doctor has not set you up with a pulmonary rehabilitation program, talk to him or her about whether rehab is right for you. Rehab includes exercise programs, education about your disease and how to manage it, help with diet and other changes, and emotional support. Diet    · Eat regular, healthy meals. Use bronchodilators about 1 hour before you eat to make it easier to eat. Eat several small meals instead of three large ones.  Drink beverages at the end of the meal. Avoid foods that are hard to chew.     · Eat foods that contain protein so that you do not lose muscle mass.     · Talk with your doctor if you gain too much weight or if you lose weight without trying.    Mental health    · Talk to your family, friends, or a therapist about your feelings. It is normal to feel frightened, angry, hopeless, helpless, and even guilty. Talking openly about bad feelings can help you cope. If these feelings last, talk to your doctor. When should you call for help? Call 911 anytime you think you may need emergency care. For example, call if:    · You have severe trouble breathing.    Call your doctor now or seek immediate medical care if:    · You have new or worse trouble breathing.     · You cough up blood.     · You have a fever.    Watch closely for changes in your health, and be sure to contact your doctor if:    · You cough more deeply or more often, especially if you notice more mucus or a change in the color of your mucus.     · You have new or worse swelling in your legs or belly.     · You are not getting better as expected. Where can you learn more? Go to http://everett-mariann.info/. Vanessa Chaparro in the search box to learn more about \"Chronic Obstructive Pulmonary Disease (COPD): Care Instructions. \"  Current as of: December 6, 2017  Content Version: 11.8  © 4064-0891 [employer]. Care instructions adapted under license by [employer] (which disclaims liability or warranty for this information). If you have questions about a medical condition or this instruction, always ask your healthcare professional. [place] [address] any warranty or liability for your use of this information.

## 2019-01-09 ENCOUNTER — OFFICE VISIT (OUTPATIENT)
Dept: FAMILY MEDICINE CLINIC | Age: 69
End: 2019-01-09

## 2019-01-09 VITALS
RESPIRATION RATE: 18 BRPM | BODY MASS INDEX: 27.6 KG/M2 | TEMPERATURE: 97.5 F | HEIGHT: 75 IN | DIASTOLIC BLOOD PRESSURE: 78 MMHG | HEART RATE: 88 BPM | SYSTOLIC BLOOD PRESSURE: 118 MMHG | OXYGEN SATURATION: 97 % | WEIGHT: 222 LBS

## 2019-01-09 DIAGNOSIS — E78.5 HYPERLIPIDEMIA, UNSPECIFIED HYPERLIPIDEMIA TYPE: Primary | ICD-10-CM

## 2019-01-09 RX ORDER — PRAVASTATIN SODIUM 10 MG/1
10 TABLET ORAL
Qty: 90 TAB | Refills: 3 | Status: SHIPPED | OUTPATIENT
Start: 2019-01-09 | End: 2019-12-16 | Stop reason: SDUPTHER

## 2019-01-09 NOTE — PROGRESS NOTES
F/U hyperlipidemia I reviewed with the resident the medical history and the resident's findings on the physical examination. I discussed with the resident the patient's diagnosis and concur with the plan.

## 2019-01-09 NOTE — PROGRESS NOTES
Subjective:  
  
Lesley Smith Monday is a 79 y.o. male who presents for follow up of hyperlipidemia. He is currently on Pravastatin 10 mg daily. Tolerating medication well, has not noticed any side effects. Diet - does try and eat healthy Does do a lot of exercise Last lipid panel - 4/2018 - Total cholesterol 164, LDL - 100 Received Flu shot Has no other concerns today. Review of Systems Constitutional: Negative for chills and fever. Respiratory: Negative for cough. Cardiovascular: Negative for chest pain and palpitations. Musculoskeletal: Negative for myalgias. PMHx: 
Past Medical History:  
Diagnosis Date  Asthma  [facility]rial [facility] [address]  Chronic obstructive pulmonary disease ([facility] [address]  HTN (hypertension)  Hypercholesterolemia Meds:  
Current Outpatient Medications Medication Sig Dispense Refill  pravastatin (PRAVACHOL) 10 mg tablet Take 1 Tab by mouth nightly. 90 Tab 3  
 apixaban (ELIQUIS) 5 mg tablet Take 5 mg by mouth daily.  amLODIPine (NORVASC) 5 mg tablet Take 5 mg by mouth daily.  metoprolol tartrate (LOPRESSOR) 100 mg IR tablet Take 100 mg by mouth daily.  mometasone-formoterol (DULERA) 100-5 mcg/actuation HFA inhaler Take 2 Puffs by inhalation two (2) times a day.  albuterol (PROAIR HFA) 90 mcg/actuation inhaler Take 2 Puffs by inhalation every four (4) hours as needed for Wheezing.  albuterol-ipratropium (DUO-NEB) 2.5 mg-0.5 mg/3 ml nebu 3 mL by Nebulization route. Allergies:  
No Known Allergies Smoker: 
Social History Tobacco Use Smoking Status Never Smoker Smokeless Tobacco Never Used ETOH:  
Social History Substance and Sexual Activity Alcohol Use No  
 
 
FH:  
Family History Problem Relation Age of Onset  Diabetes Mother  Hypertension Mother  Cancer Father   
     stomach Objective:  
 
Visit Vitals /78 (BP 1 Location: Left arm, BP Patient Position: Sitting) Pulse 88 Temp 97.5 °F (36.4 °C) (Oral) Resp 18 Ht 6' 3\" (1.905 m) Wt 222 lb (100.7 kg) SpO2 97% BMI 27.75 kg/m² Physical Examination:  
GEN: No apparent distress. Alert and oriented and responds to all questions appropriately. LUNGS: Respirations unlabored; clear to auscultation bilaterally CARDIOVASCULAR: Regular, rate, and rhythm without murmurs, gallops or rubs Assessment: ICD-10-CM ICD-9-CM 1. Hyperlipidemia, unspecified hyperlipidemia type E78.5 272.4 Plan:  
 
Continue Prvastatin 10 mg daily Counseled on side effects of medication Follow up in 3 months Patient is counseled to return to the office if symptoms do not improve as expected. Urgent consultation with the nearest Emergency Department is strongly recommended if condition worsens. Patient is counseled to follow up as recommended and to inform the office if any changes in treatment are recommended. Signed By:  Miriam Roberts MD  
 Family Medicine Resident

## 2019-01-09 NOTE — PROGRESS NOTES
Identified Patient with two Patient identifiers (Name and ). Two Patient Identifiers confirmed. Reviewed record in preparation for visit and have obtained necessary documentation. Chief Complaint Patient presents with  Cholesterol Problem  
  follow up and medication refill Visit Vitals /78 (BP 1 Location: Left arm, BP Patient Position: Sitting) Pulse 88 Temp 97.5 °F (36.4 °C) (Oral) Resp 18 Ht 6' 3\" (1.905 m) Wt 222 lb (100.7 kg) SpO2 97% BMI 27.75 kg/m² 1. Have you been to the ER, urgent care clinic since your last visit? Hospitalized since your last visit? No 
 
2. Have you seen or consulted any other health care providers outside of the [address] [place] [address] since your last visit? Include any pap smears or colon screening.  No

## 2019-03-15 ENCOUNTER — TELEPHONE (OUTPATIENT)
Dept: FAMILY MEDICINE CLINIC | Age: 69
End: 2019-03-15

## 2019-03-15 DIAGNOSIS — C44.91 BASAL CELL CARCINOMA (BCC), UNSPECIFIED SITE: Primary | ICD-10-CM

## 2019-03-15 NOTE — TELEPHONE ENCOUNTER
Dr Lara Payment: This patient has an appointment with Dr Tereso Gallo (ent), on 3/21/19 @ 9:30 am.... If ok with you, will you put an order into [facility] for a ENT consult so I can submit a referral to his insurance?????? Any questions call me. ...   (Basal cell carcinoma of left nostril). ...     Thanks  [employer]

## 2019-03-15 NOTE — TELEPHONE ENCOUNTER
----- Message from Emma Cr sent at 3/15/2019  9:19 AM EDT -----  Regarding: DR Zenaida Whitlock / Axel Dan wife is requesting a referral to Dr. Ghazal Michel appt scheduled 3/21/19 @ 9:30am. She is unsure the name of the facility.      P [phone]   Best contact:  [phone]

## 2019-03-21 PROBLEM — C44.311 BASAL CELL CARCINOMA OF SKIN OF NOSE: Status: ACTIVE | Noted: 2019-03-21

## 2019-03-21 NOTE — TELEPHONE ENCOUNTER
Spoke with patients wife (moira), & informed her that referral was faxed the 2nd time & I have confirmation that it was received. ...

## 2019-03-21 NOTE — TELEPHONE ENCOUNTER
Returned the call to [facility] and she said that office told her they have not rec'd the referral order. To only use fax # of [phone]. She said there will be a procedure appt later but that has not been scheduled. [facility] Monday (wife) is requesting a call back regarding her husbands referral to Dr. Dickens July -ENT didn't get to the practice (fax [phone]). Best contact is [phone].

## 2019-04-30 ENCOUNTER — OFFICE VISIT (OUTPATIENT)
Dept: FAMILY MEDICINE CLINIC | Age: 69
End: 2019-04-30

## 2019-04-30 VITALS
RESPIRATION RATE: 20 BRPM | WEIGHT: 231 LBS | SYSTOLIC BLOOD PRESSURE: 129 MMHG | HEIGHT: 75 IN | DIASTOLIC BLOOD PRESSURE: 97 MMHG | OXYGEN SATURATION: 98 % | HEART RATE: 80 BPM | BODY MASS INDEX: 28.72 KG/M2 | TEMPERATURE: 97.6 F

## 2019-04-30 DIAGNOSIS — E78.5 HYPERLIPIDEMIA, UNSPECIFIED HYPERLIPIDEMIA TYPE: ICD-10-CM

## 2019-04-30 DIAGNOSIS — G89.29 CHRONIC MIDLINE LOW BACK PAIN WITHOUT SCIATICA: ICD-10-CM

## 2019-04-30 DIAGNOSIS — R73.03 PREDIABETES: ICD-10-CM

## 2019-04-30 DIAGNOSIS — M54.50 CHRONIC MIDLINE LOW BACK PAIN WITHOUT SCIATICA: ICD-10-CM

## 2019-04-30 DIAGNOSIS — J41.0 SIMPLE CHRONIC BRONCHITIS (HCC): Primary | Chronic | ICD-10-CM

## 2019-04-30 NOTE — PROGRESS NOTES
Theresa Dumas Monday 79 y.o. male 1950 
[address] 
[id number] [address]: Progress Note Hyun Baumann MD 
  
 
Encounter Date: 4/30/2019 Chief Complaint Patient presents with  Medication Refill [facility]  Establish Care  
  want to establish care with Dr. Ramon Perez - used to see Dr. Danyelle Langley History of Present Illness Theresa Dumas Monday is a 79 y.o. male who presents to clinic today for follow-up on COPD (with medication request) and to establish with new provider. Had regularly seen Dr Kelvin Mckeon, whom is graduating. COPD: States his cough and wheezing is well controlled with Dulera. Has albuterol neb and MDI for acute flares. He uses this intermittently. No significant nighttime symptoms. Denies dyspnea with exertion. Back Pain: Wife present during encounter and raises concerns that patient awakens at night with acute back pain. States this lasts ~30 min to 1 hour. Denies LE numbness or weakness. Denies radicular pain. Puts OTC cream on back, utilizes heat and occasionally will take a \"norco\" with improvement in pain. Occurs infrequently per patient and is often related to increased physical activity. 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 Do for regular bloodwork for PreDM and HTN/HLD. These conditions are stable. Patient denies sx or side-effects for medication. Review of Systems General: Denies fevers, chills, confusion HEENT: Denies congestion, rhinorrhea, sore throat, ear pain 
Cardiac: Denies chest pain, palpitations, dyspnea on exertion Pulmonary: Denies shortness of breath, wheezing, cough Abdominal: Denies abdominal pain, nausea, vomiting, diarrhea or constipation : Denies dysuria, hematuria MSK: Occasional low back pain Skin: Denies rash Psych: Denies depression or anxiety Vitals/Objective:  
 
Vitals:  
 04/30/19 5353 BP: (!) 129/97 Pulse: 80 Resp: 20 Temp: 97.6 °F (36.4 °C) TempSrc: Oral  
SpO2: 98% Weight: 231 lb (104.8 kg) Height: 6' 3\" (1.905 m) Body mass index is 28.87 kg/m². General: Patient alert and oriented and in NAD HEENT: PER/EOMI, no conjunctival pallor or scleral icterus. No thyromegaly or cervical lymphadenopathy Heart: Regular rate and rhythm, No murmurs, rubs or gallops. 2+ peripheral pulses Lungs: Faint referred upper airway expiratory wheeze. Otherwise clear to auscultation for rales or rhonchi Abd: +BS, non-tender, non-distended MSK: No midline or paraspinal muscle tenderness on palpation of cervical, thoracic or lumbar spine. Negative seated straight leg raise. 1+ DTR bilaterally. Ext: No edema Skin: No rashes or lesions noted on exposed skin, Psych: Appropriate mood and affect Assessment and Plan: 1. Simple chronic bronchitis ([address] Stable. Will refill Dulera. Continue albuterol PRN. - mometasone-formoterol (DULERA) 100-5 mcg/actuation HFA inhaler; Take 2 Puffs by inhalation two (2) times a day. Dispense: 3 Inhaler; Refill: 3 
 
2. Chronic midline low back pain without sciatica May have underlying lumbar arthritis. Recommend home exercises and use of pill behind/between knees while sleeping. If pain persists, will get x-ray and consider formal physical therapy. 3. Hyperlipidemia, unspecified hyperlipidemia type Previous ASCVD risk 17.1%. Will re-evaluate lipid panel today. Cannot take ASA while on eliquis. May require intensification of statin and improved BP control. Key Antihyperlipidemia Meds   
    
  
 pravastatin (PRAVACHOL) 10 mg tablet (Taking) Take 1 Tab by mouth nightly. - METABOLIC PANEL, COMPREHENSIVE 
- LIPID PANEL 4. Prediabetes 
- CBC W/O DIFF 
- METABOLIC PANEL, COMPREHENSIVE 
- HEMOGLOBIN A1C WITH EAG 
- LIPID PANEL I have discussed the diagnosis with the patient and the intended plan as seen in the above orders. he has expressed understanding. The patient has received an after-visit summary and questions were answered concerning future plans. I have discussed medication side effects and warnings with the patient as well. Follow-up and Dispositions  · Return in about 3 months (around 7/30/2019). Electronically Signed: Bri Lezama MD 
  
History Patients past medical, surgical and family histories were reviewed and updated. Past Medical History:  
Diagnosis Date  Asthma  Atrial fibrillation ([facility] [address]  [facility]  HTN (hypertension)  Hypercholesterolemia No past surgical history on file. Family History Problem Relation Age of Onset  Diabetes Mother  Hypertension Mother  Cancer Father   
     stomach Social History Socioeconomic History  Marital status:  Spouse name: Not on file  Number of children: Not on file  Years of education: Not on file  Highest education level: Not on file Occupational History  Not on file Social Needs  Financial resource strain: Not on file  Food insecurity:  
  Worry: Not on file Inability: Not on file  Transportation needs:  
  Medical: Not on file Non-medical: Not on file Tobacco Use  Smoking status: Never Smoker  Smokeless tobacco: Never Used Substance and Sexual Activity  Alcohol use: No  
 Drug use: No  
 Sexual activity: Not on file Lifestyle  Physical activity:  
  Days per week: Not on file Minutes per session: Not on file  Stress: Not on file Relationships  Social connections:  
  Talks on phone: Not on file Gets together: Not on file Attends Gnosticist service: Not on file Active member of club or organization: Not on file Attends meetings of clubs or organizations: Not on file Relationship status: Not on file  Intimate partner violence: Fear of current or ex partner: Not on file Emotionally abused: Not on file Physically abused: Not on file Forced sexual activity: Not on file Other Topics Concern  Not on file Social History Narrative  Not on file Current Medications/Allergies Current Outpatient Medications Medication Sig Dispense Refill  pravastatin (PRAVACHOL) 10 mg tablet Take 1 Tab by mouth nightly. 90 Tab 3  
 apixaban (ELIQUIS) 5 mg tablet Take 5 mg by mouth daily.  amLODIPine (NORVASC) 5 mg tablet Take 5 mg by mouth daily.  metoprolol tartrate (LOPRESSOR) 100 mg IR tablet Take 100 mg by mouth daily.  mometasone-formoterol (DULERA) 100-5 mcg/actuation HFA inhaler Take 2 Puffs by inhalation two (2) times a day.  albuterol (PROAIR HFA) 90 mcg/actuation inhaler Take 2 Puffs by inhalation every four (4) hours as needed for Wheezing.  albuterol-ipratropium (DUO-NEB) 2.5 mg-0.5 mg/3 ml nebu 3 mL by Nebulization route. No Known Allergies

## 2019-04-30 NOTE — PATIENT INSTRUCTIONS
Learning About Chronic Bronchitis What is chronic bronchitis? Chronic bronchitis is long-term swelling and the buildup of mucus in the airways of your lungs. The airways (bronchial tubes) get inflamed and make a lot of mucus. This can narrow or block the airways, making it hard for you to breathe. It is a form of COPD (chronic obstructive pulmonary disease). Chronic bronchitis is usually caused by smoking. But chemical fumes, dust, or air pollution also can cause it over time. What can you expect when you have chronic bronchitis? Chronic bronchitis gets worse over time. You cannot undo the damage to your lungs. Over time, you may find that: 
· You get short of breath even when you do simple things like get dressed or fix a meal. 
· It is hard to eat or exercise. · You lose weight and feel weaker. Over many years, the swelling and mucus from chronic bronchitis make it more likely that you will get lung infections. But there are things you can do to prevent more damage and feel better. What are the symptoms? The main symptoms of chronic bronchitis are: · A cough that will not go away. · Mucus that comes up when you cough. · Shortness of breath that gets worse when you exercise. At times, your symptoms may suddenly flare up and get much worse. This is a called an exacerbation (say \"egg-ZAPAT-er-BAY-shun\"). When this happens, your usual symptoms quickly get worse and stay bad. This can be dangerous. You may have to go to the hospital. 
How can you keep chronic bronchitis from getting worse? Don't smoke. That is the best way to keep chronic bronchitis from getting worse. If you already smoke, it is never too late to stop. If you need help quitting, talk to your doctor about stop-smoking programs and medicines. These can increase your chances of quitting for good. You can do other things to keep chronic bronchitis from getting worse: · Avoid bad air. Air pollution, chemical fumes, and dust also can make chronic bronchitis worse. · Get a flu shot every year. A shot may keep the flu from turning into something more serious, like pneumonia. A flu shot also may lower your chances of having a flare-up. · Get a pneumococcal shot. A shot can prevent some of the serious complications of pneumonia. Ask your doctor how often you should get this shot. How is chronic bronchitis treated? Chronic bronchitis is treated with medicines and oxygen. You also can take steps at home to stay healthy and keep your condition from getting worse. Medicines and oxygen therapy · You may be taking medicines such as: 
? Bronchodilators. These help open your airways and make breathing easier. Bronchodilators are either short-acting (work for 6 to 9 hours) or long-acting (work for 24 hours). You inhale most bronchodilators, so they start to act quickly. Always carry your quick-relief inhaler with you in case you need it while you are away from home. ? Corticosteroids. These reduce airway inflammation. They come in pill or inhaled form. You must take these medicines every day for them to work well. ? Antibiotics. These medicines are used when you have a bacterial lung infection. · Take your medicines exactly as prescribed. Call your doctor if you think you are having a problem with your medicine. · Oxygen therapy boosts the amount of oxygen in your blood and helps you breathe easier. Use the flow rate your doctor has recommended, and do not change it without talking to your doctor first. 
Other care at home · If your doctor recommends it, get more exercise. Walking is a good choice. Bit by bit, increase the amount you walk every day. Try for at least 30 minutes on most days of the week. · Learn breathing methodssuch as breathing through pursed lipsto help you become less short of breath.  
· If your doctor has not set you up with a pulmonary rehabilitation program, talk to him or her about whether rehab is right for you. Rehab includes exercise programs, education about your disease and how to manage it, help with diet and other changes, and emotional support. · Eat regular, healthy meals. Use bronchodilators about 1 hour before you eat to make it easier to eat. Eat several small meals instead of three large ones. Drink beverages at the end of the meal. Avoid foods that are hard to chew. Follow-up care is a key part of your treatment and safety. Be sure to make and go to all appointments, and call your doctor if you are having problems. It's also a good idea to know your test results and keep a list of the medicines you take. Where can you learn more? Go to http://everett-mariann.info/. Enter C112 in the search box to learn more about \"Learning About Chronic Bronchitis. \" Current as of: September 5, 2018 Content Version: 11.9 © 0232-6661 [email or web address]. Care instructions adapted under license by [employer] (which disclaims liability or warranty for this information). If you have questions about a medical condition or this instruction, always ask your healthcare professional. [place] [address] any warranty or liability for your use of this information. Back Care and Preventing Injuries: Care Instructions Your Care Instructions You can hurt your back doing many everyday activities: lifting a heavy box, bending down to garden, exercising at the gym, and even getting out of bed. But you can keep your back strong and healthy by doing some exercises. You also can follow a few tips for sitting, sleeping, and lifting to avoid hurting your back again. Talk to your doctor before you start an exercise program. Ask for help if you want to learn more about keeping your back healthy. Follow-up care is a key part of your treatment and safety.  Be sure to make and go to all appointments, and call your doctor if you are having problems. It's also a good idea to know your test results and keep a list of the medicines you take. How can you care for yourself at home? · Stay at a healthy weight to avoid strain on your lower back. · Do not smoke. Smoking increases the risk of osteoporosis, which weakens the spine. If you need help quitting, talk to your doctor about stop-smoking programs and medicines. These can increase your chances of quitting for good. · Make sure you sleep in a position that maintains your back's normal curves and on a mattress that feels comfortable. Sleep on your side with a pillow between your knees, or sleep on your back with a pillow under your knees. These positions can reduce strain on your back. · When you get out of bed, lie on your side and bend both knees. Drop your feet over the edge of the bed as you push up with both arms. Scoot to the edge of the bed. Make sure your feet are in line with your rear end (buttocks), and then stand up. · If you must stand for a long time, put one foot on a stool, ledge, or box. Exercise to strengthen your back and other muscles · Get at least 30 minutes of exercise on most days of the week. Walking is a good choice. You also may want to do other activities, such as running, swimming, cycling, or playing tennis or team sports. · Stretch your back muscles. Here are few exercises to try: 
? Lie on your back with your knees bent and your feet flat on the floor. Gently pull one bent knee to your chest. Put that foot back on the floor, and then pull the other knee to your chest. Hold for 15 to 30 seconds. Repeat 2 to 4 times. ? Do pelvic tilts. Lie on your back with your knees bent. Tighten your stomach muscles. Pull your belly button (navel) in and up toward your ribs. You should feel like your back is pressing to the floor and your hips and pelvis are slightly lifting off the floor.  Hold for 6 seconds while breathing smoothly. · Keep your core muscles strong. The muscles of your back, belly (abdomen), and buttocks support your spine. ? Pull in your belly, and imagine pulling your navel toward your spine. Hold this for 6 seconds, then relax. Remember to keep breathing normally as you tense your muscles. ? Do curl-ups. Always do them with your knees bent. Keep your low back on the floor, and curl your shoulders toward your knees using a smooth, slow motion. Keep your arms folded across your chest. If this bothers your neck, try putting your hands behind your neck (not your head), with your elbows spread apart. ? Lie on your back with your knees bent and your feet flat on the floor. Tighten your belly muscles, and then push with your feet and raise your buttocks up a few inches. Hold this position 6 seconds as you continue to breathe normally, then lower yourself slowly to the floor. Repeat 8 to 12 times. ? If you like group exercise, try Pilates or yoga. These classes have poses that strengthen the core muscles. Protect your back when you sit · Place a small pillow, a rolled-up towel, or a lumbar roll in the curve of your back if you need extra support. · Sit in a chair that is low enough to let you place both feet flat on the floor with both knees nearly level with your hips. If your chair or desk is too high, use a foot rest to raise your knees. · When driving, keep your knees nearly level with your hips. Sit straight, and drive with both hands on the steering wheel. Your arms should be in a slightly bent position. · Try a kneeling chair, which helps tilt your hips forward. This takes pressure off your lower back. · Try sitting on an exercise ball. It can rock from side to side, which helps keep your back loose. Lift properly · Squat down, bending at the hips and knees only. If you need to, put one knee to the floor and extend your other knee in front of you, bent at a right angle (half kneeling). · Press your chest straight forward. This helps keep your upper back straight while keeping a slight arch in your low back. · Hold the load as close to your body as possible, at the level of your navel. · Use your feet to change direction, taking small steps. · Lead with your hips as you change direction. Keep your shoulders in line with your hips as you move. Do not twist your body. · Set down your load carefully, squatting with your knees and hips only. When should you call for help? Watch closely for changes in your health, and be sure to contact your doctor if you have any problems. Where can you learn more? Go to http://everett-mariann.info/. Enter S810 in the search box to learn more about \"Back Care and Preventing Injuries: Care Instructions. \" Current as of: September 20, 2018 Content Version: 11.9 © 2201-9661 [employer]. Care instructions adapted under license by [employer] (which disclaims liability or warranty for this information). If you have questions about a medical condition or this instruction, always ask your healthcare professional. [address] any warranty or liability for your use of this information. Low Back Pain: Exercises Your Care Instructions Here are some examples of typical rehabilitation exercises for your condition. Start each exercise slowly. Ease off the exercise if you start to have pain. Your doctor or physical therapist will tell you when you can start these exercises and which ones will work best for you. How to do the exercises Press-up 1. Lie on your stomach, supporting your body with your forearms. 2. Press your elbows down into the floor to raise your upper back. As you do this, relax your stomach muscles and allow your back to arch without using your back muscles. As your press up, do not let your hips or pelvis come off the floor. 3. Hold for 15 to 30 seconds, then relax. 4. Repeat 2 to 4 times. Alternate arm and leg (bird dog) exercise 1. Start on the floor, on your hands and knees. 2. Tighten your belly muscles. 3. Raise one leg off the floor, and hold it straight out behind you. Be careful not to let your hip drop down, because that will twist your trunk. 4. Hold for about 6 seconds, then lower your leg and switch to the other leg. 5. Repeat 8 to 12 times on each leg. 6. Over time, work up to holding for 10 to 30 seconds each time. 7. If you feel stable and secure with your leg raised, try raising the opposite arm straight out in front of you at the same time. Knee-to-chest exercise 1. Lie on your back with your knees bent and your feet flat on the floor. 2. Bring one knee to your chest, keeping the other foot flat on the floor (or keeping the other leg straight, whichever feels better on your lower back). 3. Keep your lower back pressed to the floor. Hold for at least 15 to 30 seconds. 4. Relax, and lower the knee to the starting position. 5. Repeat with the other leg. Repeat 2 to 4 times with each leg. 6. To get more stretch, put your other leg flat on the floor while pulling your knee to your chest. 
 
Curl-ups 1. Lie on the floor on your back with your knees bent at a 90-degree angle. Your feet should be flat on the floor, about 12 inches from your buttocks. 2. Cross your arms over your chest. If this bothers your neck, try putting your hands behind your neck (not your head), with your elbows spread apart. 3. Slowly tighten your belly muscles and raise your shoulder blades off the floor. 4. Keep your head in line with your body, and do not press your chin to your chest. 
5. Hold this position for 1 or 2 seconds, then slowly lower yourself back down to the floor. 6. Repeat 8 to 12 times. Pelvic tilt exercise 1. Lie on your back with your knees bent. 2. \"Brace\" your stomach.  This means to tighten your muscles by pulling in and imagining your belly button moving toward your spine. You should feel like your back is pressing to the floor and your hips and pelvis are rocking back. 3. Hold for about 6 seconds while you breathe smoothly. 4. Repeat 8 to 12 times. Heel dig bridging 1. Lie on your back with both knees bent and your ankles bent so that only your heels are digging into the floor. Your knees should be bent about 90 degrees. 2. Then push your heels into the floor, squeeze your buttocks, and lift your hips off the floor until your shoulders, hips, and knees are all in a straight line. 3. Hold for about 6 seconds as you continue to breathe normally, and then slowly lower your hips back down to the floor and rest for up to 10 seconds. 4. Do 8 to 12 repetitions. Hamstring stretch in doorway 1. Lie on your back in a doorway, with one leg through the open door. 2. Slide your leg up the wall to straighten your knee. You should feel a gentle stretch down the back of your leg. 3. Hold the stretch for at least 15 to 30 seconds. Do not arch your back, point your toes, or bend either knee. Keep one heel touching the floor and the other heel touching the wall. 4. Repeat with your other leg. 5. Do 2 to 4 times for each leg. Hip flexor stretch 1. Kneel on the floor with one knee bent and one leg behind you. Place your forward knee over your foot. Keep your other knee touching the floor. 2. Slowly push your hips forward until you feel a stretch in the upper thigh of your rear leg. 3. Hold the stretch for at least 15 to 30 seconds. Repeat with your other leg. 4. Do 2 to 4 times on each side. Wall sit 1. Stand with your back 10 to 12 inches away from a wall. 2. Lean into the wall until your back is flat against it. 3. Slowly slide down until your knees are slightly bent, pressing your lower back into the wall. 4. Hold for about 6 seconds, then slide back up the wall. 5. Repeat 8 to 12 times. Follow-up care is a key part of your treatment and safety. Be sure to make and go to all appointments, and call your doctor if you are having problems. It's also a good idea to know your test results and keep a list of the medicines you take. Where can you learn more? Go to http://everett-mariann.info/. Enter C010 in the search box to learn more about \"Low Back Pain: Exercises. \" Current as of: September 20, 2018 Content Version: 11.9 © 2458-6055 [employer]. Care instructions adapted under license by [employer] (which disclaims liability or warranty for this information). If you have questions about a medical condition or this instruction, always ask your healthcare professional. [address] any warranty or liability for your use of this information.

## 2019-04-30 NOTE — PROGRESS NOTES
Identified Patient with two Patient identifiers (Name and ). Two Patient Identifiers confirmed. Reviewed record in preparation for visit and have obtained necessary documentation. Chief Complaint Patient presents with  Medication Refill Ramon Farley  Establish Care  
  wants to establish care with Dr. Hilaria Londono - used to see Dr. Josseline Doan Visit Vitals BP (!) 129/97 (BP 1 Location: Right arm, BP Patient Position: Sitting) Pulse 80 Temp 97.6 °F (36.4 °C) (Oral) Resp 20 Ht 6' 3\" (1.905 m) Wt 231 lb (104.8 kg) SpO2 98% BMI 28.87 kg/m² 1. Have you been to the ER, urgent care clinic since your last visit? Hospitalized since your last visit? No 
 
2. Have you seen or consulted any other health care providers outside of the [address] [place] [address] since your last visit? Include any pap smears or colon screening.  No

## 2019-05-01 LAB
ALBUMIN SERPL-MCNC: 4.6 G/DL (ref 3.6–4.8)
ALBUMIN/GLOB SERPL: 2 {RATIO} (ref 1.2–2.2)
ALP SERPL-CCNC: 115 IU/L (ref 39–117)
ALT SERPL-CCNC: 14 IU/L (ref 0–44)
AST SERPL-CCNC: 21 IU/L (ref 0–40)
BILIRUB SERPL-MCNC: 0.7 MG/DL (ref 0–1.2)
BUN SERPL-MCNC: 17 MG/DL (ref 8–27)
BUN/CREAT SERPL: 15 (ref 10–24)
CALCIUM SERPL-MCNC: 9.5 MG/DL (ref 8.6–10.2)
CHLORIDE SERPL-SCNC: 98 MMOL/L (ref 96–106)
CHOLEST SERPL-MCNC: 186 MG/DL (ref 100–199)
CO2 SERPL-SCNC: 25 MMOL/L (ref 20–29)
CREAT SERPL-MCNC: 1.11 MG/DL (ref 0.76–1.27)
ERYTHROCYTE [DISTWIDTH] IN BLOOD BY AUTOMATED COUNT: 14.7 % (ref 12.3–15.4)
EST. AVERAGE GLUCOSE BLD GHB EST-MCNC: 123 MG/DL
GLOBULIN SER CALC-MCNC: 2.3 G/DL (ref 1.5–4.5)
GLUCOSE SERPL-MCNC: 101 MG/DL (ref 65–99)
HBA1C MFR BLD: 5.9 % (ref 4.8–5.6)
HCT VFR BLD AUTO: 46.8 % (ref 37.5–51)
HDLC SERPL-MCNC: 51 MG/DL
HGB BLD-MCNC: 15.5 G/DL (ref 13–17.7)
INTERPRETATION, 910389: NORMAL
LDLC SERPL CALC-MCNC: 107 MG/DL (ref 0–99)
MCH RBC QN AUTO: 28.3 PG (ref 26.6–33)
MCHC RBC AUTO-ENTMCNC: 33.1 G/DL (ref 31.5–35.7)
MCV RBC AUTO: 85 FL (ref 79–97)
PLATELET # BLD AUTO: 219 X10E3/UL (ref 150–379)
POTASSIUM SERPL-SCNC: 5.1 MMOL/L (ref 3.5–5.2)
PROT SERPL-MCNC: 6.9 G/DL (ref 6–8.5)
RBC # BLD AUTO: 5.48 X10E6/UL (ref 4.14–5.8)
SODIUM SERPL-SCNC: 139 MMOL/L (ref 134–144)
TRIGL SERPL-MCNC: 139 MG/DL (ref 0–149)
VLDLC SERPL CALC-MCNC: 28 MG/DL (ref 5–40)
WBC # BLD AUTO: 5.9 X10E3/UL (ref 3.4–10.8)

## 2019-08-30 ENCOUNTER — TELEPHONE (OUTPATIENT)
Dept: FAMILY MEDICINE CLINIC | Age: 69
End: 2019-08-30

## 2019-08-30 NOTE — TELEPHONE ENCOUNTER
----- Message from Collette Heimlich sent at 8/29/2019  5:37 PM EDT -----  Regarding: Dr. Parker Patient telephone   Contact: [phone]  Pt requesting a return call regarding availability to be seen for medication evaluation for Rx Amlodipine. Pt is unsure if he should be taking the medication per the cardiologist, Dr. Peter Garcia. Pt would like to discuss being seen.

## 2019-08-30 NOTE — TELEPHONE ENCOUNTER
effective: 04/30/2019 expires: 04/28/2020] Ana Stahl    Returned the call and spoke with wife. She made an appointment with another physician. Also, she did make the upcoming [facility] with Dr. Leopoldo Nava in October.

## 2019-09-03 ENCOUNTER — OFFICE VISIT (OUTPATIENT)
Dept: FAMILY MEDICINE CLINIC | Age: 69
End: 2019-09-03

## 2019-09-03 VITALS
OXYGEN SATURATION: 95 % | WEIGHT: 232 LBS | DIASTOLIC BLOOD PRESSURE: 82 MMHG | RESPIRATION RATE: 18 BRPM | SYSTOLIC BLOOD PRESSURE: 114 MMHG | BODY MASS INDEX: 28.85 KG/M2 | HEART RATE: 83 BPM | HEIGHT: 75 IN | TEMPERATURE: 97.7 F

## 2019-09-03 DIAGNOSIS — I10 ESSENTIAL HYPERTENSION: Primary | ICD-10-CM

## 2019-09-03 RX ORDER — AMLODIPINE BESYLATE 5 MG/1
5 TABLET ORAL DAILY
Qty: 90 TAB | Refills: 3 | Status: SHIPPED | OUTPATIENT
Start: 2019-09-03 | End: 2020-09-17

## 2019-09-03 NOTE — PROGRESS NOTES
Subjective   Sheila Duran Monday is a 79 y.o. male who presents for a refill of his amlodopine. Patient states he is compliant with his medications on a daily basis and has not experienced any side effects. He last saw his Cardiologist (Dr. Peter Garcia) in 04/2019 and is due to see him again this month on the 19th. He cannot remember if he was supposed to stay on the medication. He denies any chest pain, headaches, vision changes, palpitations, shortness of breath, or lower extremity swelling. Review of Systems (negative unless in BOLD)  General/Constitutional:   No headache, fever, fatigue, weight loss or weight gain       Eyes:   No redness, pruritis, pain, visual changes, swelling, or discharge      Cardiac:    No chest pain or palpitations       Respiratory:   No cough or shortness of breath     GI:   No nausea/vomiting, diarrhea, abdominal pain, bloody or dark stools       :   No dysuria or  hematuria    Neurological:   No loss of consciousness, problems with balance, or unilateral weakness     Skin: No rash     Allergies   No Known Allergies    Medications  Current Outpatient Medications   Medication Sig    mometasone-formoterol (DULERA) 100-5 mcg/actuation HFA inhaler Take 2 Puffs by inhalation two (2) times a day.  pravastatin (PRAVACHOL) 10 mg tablet Take 1 Tab by mouth nightly.  apixaban (ELIQUIS) 5 mg tablet Take 5 mg by mouth daily.  amLODIPine (NORVASC) 5 mg tablet Take 5 mg by mouth daily.  metoprolol tartrate (LOPRESSOR) 100 mg IR tablet Take 100 mg by mouth daily.  albuterol (PROAIR HFA) 90 mcg/actuation inhaler Take 2 Puffs by inhalation every four (4) hours as needed for Wheezing.  albuterol-ipratropium (DUO-NEB) 2.5 mg-0.5 mg/3 ml nebu 3 mL by Nebulization route. No current facility-administered medications for this visit.         Medical History  Past Medical History:   Diagnosis Date    Asthma     Atrial fibrillation (HCC)     [facility]     HTN (hypertension)     Hypercholesterolemia      Immunizations   Immunization History   Administered Date(s) Administered    Tdap 08/30/2018    Zoster Recombinant 09/25/2018     Objective   Visit Vitals  /82 (BP 1 Location: Left arm, BP Patient Position: Sitting)   Pulse 83   Temp 97.7 °F (36.5 °C) (Oral)   Resp 18   Ht 6' 3\" (1.905 m)   Wt 232 lb (105.2 kg)   SpO2 95%   BMI 29.00 kg/m²     Physical Examination  GEN: No apparent distress. Alert and oriented and responds to all questions appropriately. EYES:  Conjunctiva clear; pupils round and reactive to light; extraocular movements areintact. NECK:  Supple; no masses; thyroid normal           LUNGS: Respirations unlabored; clear to auscultation bilaterally  CARDIOVASCULAR: Regular, rate, and rhythm without murmurs, gallops or rubs   NEUROLOGIC:  No focal neurologic deficits. Strength and sensation grossly intact. Coordination and gait grossly intact. EXT: Well perfused. No edema. SKIN: No obvious rashes. Assessment   Christopher Thompson Monday is a 79 y.o. who presents for a refill of amlodipine. Plan   1. Essential hypertension  · Patient is compliant with his Amlodopine 5mg daily and his BP is normotensive today. He denies any symptoms suggestive of urgent hypertension or hypotension. · I reviewed Dr. Christian Abdi' note from 04/2019, scanned into media, in which he states the patient is to continue on the medication. · Refilled: amLODIPine (NORVASC) 5 mg tablet; Take 1 Tab by mouth daily. Dispense: 90 Tab; Refill: 3    I have discussed the aforementioned diagnoses and plan with the patient in detail. I have provided information in person and/or in AVS. All questions answered prior to discharge.     I discussed this patient with Dr. Yesika Merrill (Attending Physician)   Signed By:  Phylicia Woodson MD    Family Medicine Resident

## 2019-09-03 NOTE — PROGRESS NOTES
Identified pt with two pt identifiers(name and ). Reviewed record in preparation for visit and have obtained necessary documentation. Chief Complaint   Patient presents with    Blood Pressure Check        Health Maintenance Due   Topic    FOBT Q 1 YEAR AGE 54-65     Pneumococcal 65+ years (1 of 2 - PCV13)    Shingrix Vaccine Age 50> (2 of 2)    Influenza Age 5 to Adult     MEDICARE YEARLY EXAM        Visit Vitals  /82 (BP 1 Location: Left arm, BP Patient Position: Sitting)   Pulse 83   Temp 97.7 °F (36.5 °C) (Oral)   Resp 18   Ht 6' 3\" (1.905 m)   Wt 232 lb (105.2 kg)   SpO2 95%   BMI 29.00 kg/m²         Coordination of Care Questionnaire:  :   1) Have you been to an emergency room, urgent care, or hospitalized since your last visit? If yes, where when, and reason for visit? no       2. Have seen or consulted any other health care provider since your last visit? If yes, where when, and reason for visit? NO      3) Do you have an Advanced Directive/ Living Will in place? NO  If no, would you like information NO    Patient is accompanied by self I have received verbal consent from Rebeca Anderson Monday to discuss any/all medical information while they are present in the room.

## 2019-09-03 NOTE — PROGRESS NOTES
Here for medication refill - amlodipine    Sees cardiologist for atrial fib    I reviewed with the resident the medical history and the resident's findings on the physical examination. I discussed with the resident the patient's diagnosis and concur with the plan.

## 2019-09-20 ENCOUNTER — OFFICE VISIT (OUTPATIENT)
Dept: FAMILY MEDICINE CLINIC | Age: 69
End: 2019-09-20

## 2019-09-20 VITALS
TEMPERATURE: 96.8 F | RESPIRATION RATE: 18 BRPM | OXYGEN SATURATION: 99 % | BODY MASS INDEX: 29.22 KG/M2 | DIASTOLIC BLOOD PRESSURE: 78 MMHG | HEART RATE: 51 BPM | SYSTOLIC BLOOD PRESSURE: 114 MMHG | HEIGHT: 75 IN | WEIGHT: 235 LBS

## 2019-09-20 DIAGNOSIS — J18.9 PNEUMONIA OF RIGHT MIDDLE LOBE DUE TO INFECTIOUS ORGANISM: ICD-10-CM

## 2019-09-20 DIAGNOSIS — R05.9 COUGH IN ADULT: Primary | ICD-10-CM

## 2019-09-20 RX ORDER — GUAIFENESIN 600 MG/1
600 TABLET, EXTENDED RELEASE ORAL 2 TIMES DAILY
Qty: 30 TAB | Refills: 0 | Status: SHIPPED | OUTPATIENT
Start: 2019-09-20

## 2019-09-20 RX ORDER — AZITHROMYCIN 250 MG/1
TABLET, FILM COATED ORAL
Qty: 6 TAB | Refills: 0 | Status: SHIPPED | OUTPATIENT
Start: 2019-09-20 | End: 2019-11-14 | Stop reason: ALTCHOICE

## 2019-09-20 RX ORDER — METRONIDAZOLE 500 MG/1
500 TABLET ORAL 2 TIMES DAILY
Qty: 14 TAB | Refills: 0 | Status: SHIPPED | OUTPATIENT
Start: 2019-09-20 | End: 2019-09-27

## 2019-09-20 NOTE — PROGRESS NOTES
Identified pt with two pt identifiers(name and ). Chief Complaint   Patient presents with    Cough     Began about a month ago    Nasal Congestion        Health Maintenance Due   Topic    FOBT Q 1 YEAR AGE 50-75     Pneumococcal 65+ years (1 of 2 - PCV13)    Shingrix Vaccine Age 50> (2 of 2)    Influenza Age 5 to Adult     MEDICARE YEARLY EXAM        Wt Readings from Last 3 Encounters:   19 235 lb (106.6 kg)   19 232 lb (105.2 kg)   19 231 lb (104.8 kg)     Temp Readings from Last 3 Encounters:   19 96.8 °F (36 °C) (Oral)   19 97.7 °F (36.5 °C) (Oral)   19 97.6 °F (36.4 °C) (Oral)     BP Readings from Last 3 Encounters:   19 114/78   19 114/82   19 (!) 129/97     Pulse Readings from Last 3 Encounters:   19 (!) 51   19 83   19 80         Learning Assessment:  :     Learning Assessment 9/3/2019 5/10/2018 2018   PRIMARY LEARNER Patient Patient Patient   HIGHEST LEVEL OF EDUCATION - PRIMARY LEARNER  GRADUATED HIGH SCHOOL OR GED GRADUATED HIGH SCHOOL OR GED GRADUATED HIGH SCHOOL OR GED   BARRIERS PRIMARY LEARNER NONE NONE OTHER   CO-LEARNER CAREGIVER No No Yes   CO-LEARNER NAME - - [facility] Monday   PRIMARY LANGUAGE ENGLISH ENGLISH ENGLISH   LEARNER PREFERENCE PRIMARY LISTENING LISTENING DEMONSTRATION     DEMONSTRATION - -   ANSWERED BY patient self Karine Monday   RELATIONSHIP SELF SELF SPOUSE       Depression Screening:  :     3 most recent PHQ Screens 9/3/2019   Little interest or pleasure in doing things Not at all   Feeling down, depressed, irritable, or hopeless Not at all   Total Score PHQ 2 0       Fall Risk Assessment:  :     Fall Risk Assessment, last 12 mths 9/3/2019   Able to walk? Yes   Fall in past 12 months? No   Fall with injury? -   Number of falls in past 12 months -   Fall Risk Score -       Abuse Screening:  :     Abuse Screening Questionnaire 9/3/2019 2018   Do you ever feel afraid of your partner?  N N   Are you in a relationship with someone who physically or mentally threatens you? N N   Is it safe for you to go home? Y Y       Coordination of Care Questionnaire:  :     1) Have you been to an emergency room, urgent care clinic since your last visit? no   Hospitalized since your last visit? no             2) Have you seen or consulted any other health care providers outside of [address] [place] [address] since your last visit? no  (Include any pap smears or colon screenings in this section.)    3) Do you have an Advance Directive on file? no  Are you interested in receiving information about Advance Directives? no    Reviewed record in preparation for visit and have obtained necessary documentation. Medication reconciliation up to date and corrected with patient at this time.

## 2019-09-20 NOTE — PROGRESS NOTES
Chief Complaint   Patient presents with    Cough     Began about a month ago    Nasal Congestion   :    SHAYY Gale Monday is a 79 y.o. male who presents for cough for a month ago and not getting worse. the context: No sick contact   worsened by laying in bed. Associated symptoms are wheezing ( chronic), white mucus without blood. Patient has tried cough syrup with some relief. Uses inhaler bid. Patient denies fever, chills, postnasal drip  CP/ SOB      Allergies - reviewed:   No Known Allergies    Meds - reviewed:   Current Outpatient Medications   Medication Sig Dispense Refill    guaiFENesin ER (MUCINEX) 600 mg ER tablet Take 1 Tab by mouth two (2) times a day. 30 Tab 0    azithromycin (ZITHROMAX) 250 mg tablet 2 tablets first day then  1 tablet daily for 4 days. 6 Tab 0    metroNIDAZOLE (FLAGYL) 500 mg tablet Take 1 Tab by mouth two (2) times a day for 7 days. 14 Tab 0    amLODIPine (NORVASC) 5 mg tablet Take 1 Tab by mouth daily. 90 Tab 3    mometasone-formoterol (DULERA) 100-5 mcg/actuation HFA inhaler Take 2 Puffs by inhalation two (2) times a day. 3 Inhaler 3    pravastatin (PRAVACHOL) 10 mg tablet Take 1 Tab by mouth nightly. 90 Tab 3    apixaban (ELIQUIS) 5 mg tablet Take 5 mg by mouth daily.  metoprolol tartrate (LOPRESSOR) 100 mg IR tablet Take 100 mg by mouth daily.  albuterol (PROAIR HFA) 90 mcg/actuation inhaler Take 2 Puffs by inhalation every four (4) hours as needed for Wheezing.  albuterol-ipratropium (DUO-NEB) 2.5 mg-0.5 mg/3 ml nebu 3 mL by Nebulization route.          PMH- reviewed:  Past Medical History:   Diagnosis Date    Asthma     Atrial fibrillation ([facility]     Chronic obstructive pulmonary disease (HCC)     HTN (hypertension)     Hypercholesterolemia        SH- reviewed:  Smoker:  Social History     Tobacco Use   Smoking Status Never Smoker   Smokeless Tobacco Never Used       ETOH- reviewed:   Social History     Substance and Sexual Activity   Alcohol Use No FH- reviewed:   Family History   Problem Relation Age of Onset    Diabetes Mother     Hypertension Mother     Cancer Father         stomach         ROS: Positive for Items in bold:   Constitutional: headache, fever, fatigue, weight loss or weight gain      Cardiac: chest pain      Resp: cough or shortness of breath      GI: nausea, vomiting, constipation, diarrhea, blood in stool  : frequency, urgency, dysuria, hematuria   Neuro: dizziness, numbness, tingling  Psych: anxiety, depression, stress     Physical Exam:  Visit Vitals  /78 (BP 1 Location: Right arm, BP Patient Position: Sitting) Comment: Manual   Pulse (!) 51   Temp 96.8 °F (36 °C) (Oral) Comment: .   Resp 18   Ht 6' 3\" (1.905 m)   Wt 235 lb (106.6 kg)   SpO2 99%   BMI 29.37 kg/m²       Gen: No apparent distress. Alert and oriented and responds to all questions appropriately. Lungs: Respirations unlabored; RLL coarse rales. Cardio: irregularly irregular, and rhythm without murmurs, gallops or rubs   Abdomen: Soft; nontender; nondistended; normoactive bowel sounds; no masses or organomegaly  Skin: No obvious rashes.       Lab Results   Component Value Date/Time    Sodium 139 04/30/2019 09:15 AM    Potassium 5.1 04/30/2019 09:15 AM    Chloride 98 04/30/2019 09:15 AM    CO2 25 04/30/2019 09:15 AM    Glucose 101 (H) 04/30/2019 09:15 AM    BUN 17 04/30/2019 09:15 AM    Creatinine 1.11 04/30/2019 09:15 AM    BUN/Creatinine ratio 15 04/30/2019 09:15 AM    GFR est AA 80 04/30/2019 09:15 AM    GFR est non-AA 69 04/30/2019 09:15 AM    Calcium 9.5 04/30/2019 09:15 AM     Lab Results   Component Value Date/Time    Cholesterol, total 186 04/30/2019 09:15 AM    HDL Cholesterol 51 04/30/2019 09:15 AM    LDL, calculated 107 (H) 04/30/2019 09:15 AM    VLDL, calculated 28 04/30/2019 09:15 AM    Triglyceride 139 04/30/2019 09:15 AM     Lab Results   Component Value Date/Time    Hemoglobin A1c 5.9 (H) 04/30/2019 09:15 AM       I have personally reviewed the labs results        Assessment and Plan:   Christopher Thompson Monday is a 79 y.o. male who presents for  Cough. XR showed R middle Lobe pneumonia . Will treat with Azithromycin and Metronidazole. ED precaution given. F/u in 7days. ICD-10-CM ICD-9-CM    1. Cough in adult R05 786.2 XR CHEST PA LAT      guaiFENesin ER (MUCINEX) 600 mg ER tablet   2. Pneumonia of right middle lobe due to infectious organism ([facility] J18.1 486 azithromycin (ZITHROMAX) 250 mg tablet      metroNIDAZOLE (FLAGYL) 500 mg tablet         Discussed diagnoses in detail with patient. Patient expressed understanding of and agreement to above plan. All questions and concerns addressed. Medication risks/benefits/side effects discussed with patient. Patient is counseled to return to the office and/or ED if symptoms do not improve as expected. Patient seen and  discussed with Dr. Brenton Miller, Attending Physician. Glenis Del Real MD  09/20/19    Family Medicine Resident

## 2019-09-30 ENCOUNTER — OFFICE VISIT (OUTPATIENT)
Dept: FAMILY MEDICINE CLINIC | Age: 69
End: 2019-09-30

## 2019-09-30 VITALS
DIASTOLIC BLOOD PRESSURE: 83 MMHG | HEIGHT: 75 IN | HEART RATE: 92 BPM | WEIGHT: 236 LBS | SYSTOLIC BLOOD PRESSURE: 122 MMHG | BODY MASS INDEX: 29.34 KG/M2 | TEMPERATURE: 98.1 F | OXYGEN SATURATION: 96 % | RESPIRATION RATE: 16 BRPM

## 2019-09-30 DIAGNOSIS — J18.9 PNEUMONIA OF RIGHT MIDDLE LOBE DUE TO INFECTIOUS ORGANISM: Primary | ICD-10-CM

## 2019-09-30 DIAGNOSIS — J44.9 CHRONIC OBSTRUCTIVE PULMONARY DISEASE, UNSPECIFIED COPD TYPE (HCC): ICD-10-CM

## 2019-09-30 PROBLEM — J41.0 SIMPLE CHRONIC BRONCHITIS (HCC): Chronic | Status: RESOLVED | Noted: 2018-12-31 | Resolved: 2019-09-30

## 2019-09-30 NOTE — PROGRESS NOTES
Chief Complaint   Patient presents with    Cough   :    SHAYY Malik Monday is a 79 y.o. male who presents for a follow up on pneumonia. Feeling better. Has completed Antibiotics course. Cough is still there but minimal. Patient denies fever, chills  CP/ SOB/ N/V/ diarrhea. He does take Dulera BID    Allergies - reviewed:   No Known Allergies    Meds - reviewed:   Current Outpatient Medications   Medication Sig Dispense Refill    guaiFENesin ER (MUCINEX) 600 mg ER tablet Take 1 Tab by mouth two (2) times a day. 30 Tab 0    amLODIPine (NORVASC) 5 mg tablet Take 1 Tab by mouth daily. 90 Tab 3    mometasone-formoterol (DULERA) 100-5 mcg/actuation HFA inhaler Take 2 Puffs by inhalation two (2) times a day. 3 Inhaler 3    pravastatin (PRAVACHOL) 10 mg tablet Take 1 Tab by mouth nightly. 90 Tab 3    apixaban (ELIQUIS) 5 mg tablet Take 5 mg by mouth daily.  metoprolol tartrate (LOPRESSOR) 100 mg IR tablet Take 100 mg by mouth daily.  azithromycin (ZITHROMAX) 250 mg tablet 2 tablets first day then  1 tablet daily for 4 days. 6 Tab 0    albuterol (PROAIR HFA) 90 mcg/actuation inhaler Take 2 Puffs by inhalation every four (4) hours as needed for Wheezing.  albuterol-ipratropium (DUO-NEB) 2.5 mg-0.5 mg/3 ml nebu 3 mL by Nebulization route.          PMH- reviewed:  Past Medical History:   Diagnosis Date    Asthma     Atrial fibrillation (HCC)     Chronic obstructive pulmonary disease (HCC)     HTN (hypertension)     Hypercholesterolemia        SH- reviewed:  Smoker:  Social History     Tobacco Use   Smoking Status Never Smoker   Smokeless Tobacco Never Used       ETOH- reviewed:   Social History     Substance and Sexual Activity   Alcohol Use No       FH- reviewed:   Family History   Problem Relation Age of Onset    Diabetes Mother     Hypertension Mother     Cancer Father         stomach         ROS: Positive for Items in bold:   Constitutional: headache, fever, fatigue, weight loss or weight gain      Eyes: redness, pruritis, pain, visual changes, swelling, or discharge      Ears: ear pain, loss or changes in hearing     Nose: congestion, rhinorrhea  Oropharynx: sore throat, lesions in throat  Cardiac: chest pain      Resp: cough or shortness of breath        Physical Exam:  Visit Vitals  /83 (BP 1 Location: Left arm, BP Patient Position: Sitting)   Pulse 92   Temp 98.1 °F (36.7 °C) (Oral)   Resp 16   Ht 6' 3\" (1.905 m)   Wt 236 lb (107 kg)   SpO2 96%   BMI 29.50 kg/m²       Gen: No apparent distress. Alert and oriented and responds to all questions appropriately. Lungs: Respirations unlabored; mild diffuse wheezing  Cardio: Regular, rate, and rhythm without murmurs, gallops or rubs   Abdomen: Soft; nontender; nondistended; normoactive bowel sounds; no masses or organomegaly  Skin: No obvious rashes. Lab Results   Component Value Date/Time    Cholesterol, total 186 04/30/2019 09:15 AM    HDL Cholesterol 51 04/30/2019 09:15 AM    LDL, calculated 107 (H) 04/30/2019 09:15 AM    VLDL, calculated 28 04/30/2019 09:15 AM    Triglyceride 139 04/30/2019 09:15 AM     Lab Results   Component Value Date/Time    Hemoglobin A1c 5.9 (H) 04/30/2019 09:15 AM           Assessment and Plan:   Sheree Edwards Monday is a 79 y.o. male who presents for follow up on Pneumonia. Doing much better. Very faint wheezing on exam which has also improved while I was in the room. Patient states he wheeze a little with exercise but he does not take his albuterol. Advised to take his home inhalers and Albuterol. RTC if gets worse. Will repeat CXR to ensure resolution of pneumonia. ICD-10-CM ICD-9-CM    1. Pneumonia of right middle lobe due to infectious organism ([facility] J18.1 486 XR CHEST PA LAT   2. Chronic obstructive pulmonary disease, unspecified COPD type ([facility] J44.9 496        Discussed diagnoses in detail with patient. Patient expressed understanding of and agreement to above plan.  All questions and concerns addressed. Medication risks/benefits/side effects discussed with patient. Patient is counseled to return to the office and/or ED if symptoms do not improve as expected. Patient  discussed with Dr. Odalys Truong, Attending Physician. Glenis Del Real MD  09/30/19    Family Medicine Resident

## 2019-09-30 NOTE — PROGRESS NOTES
Chief Complaint   Patient presents with    Cough     1. Have you been to the ER, urgent care clinic since your last visit? Hospitalized since your last visit? No    2. Have you seen or consulted any other health care providers outside of the [address] [place] [address] since your last visit? Include any pap smears or colon screening.  No

## 2019-10-08 ENCOUNTER — OFFICE VISIT (OUTPATIENT)
Dept: FAMILY MEDICINE CLINIC | Age: 69
End: 2019-10-08

## 2019-10-08 VITALS
RESPIRATION RATE: 17 BRPM | HEART RATE: 67 BPM | WEIGHT: 239 LBS | BODY MASS INDEX: 29.72 KG/M2 | DIASTOLIC BLOOD PRESSURE: 91 MMHG | OXYGEN SATURATION: 98 % | SYSTOLIC BLOOD PRESSURE: 132 MMHG | TEMPERATURE: 98 F | HEIGHT: 75 IN

## 2019-10-08 DIAGNOSIS — J18.9 PNEUMONIA OF RIGHT MIDDLE LOBE DUE TO INFECTIOUS ORGANISM: ICD-10-CM

## 2019-10-08 DIAGNOSIS — Z00.00 MEDICARE ANNUAL WELLNESS VISIT, SUBSEQUENT: Primary | ICD-10-CM

## 2019-10-08 DIAGNOSIS — Z13.31 SCREENING FOR DEPRESSION: ICD-10-CM

## 2019-10-08 DIAGNOSIS — Z71.89 ADVANCED DIRECTIVES, COUNSELING/DISCUSSION: ICD-10-CM

## 2019-10-08 DIAGNOSIS — Z13.39 SCREENING FOR ALCOHOLISM: ICD-10-CM

## 2019-10-08 DIAGNOSIS — Z12.5 SPECIAL SCREENING FOR MALIGNANT NEOPLASM OF PROSTATE: ICD-10-CM

## 2019-10-08 DIAGNOSIS — R73.03 PREDIABETES: ICD-10-CM

## 2019-10-08 DIAGNOSIS — Z12.11 COLON CANCER SCREENING: ICD-10-CM

## 2019-10-08 DIAGNOSIS — Z23 ENCOUNTER FOR IMMUNIZATION: ICD-10-CM

## 2019-10-08 NOTE — PROGRESS NOTES
Chief Complaint   Patient presents with   [facility] Annual Wellness Visit     1. Have you been to the ER, urgent care clinic since your last visit? Hospitalized since your last visit? No    2. Have you seen or consulted any other health care providers outside of the [address] [place] [address] since your last visit? Include any pap smears or colon screening. No        General Health Questions   -During the past 4 weeks:   -how would you rate your health in general? Good   -how often have you been bothered by feeling dizzy when standing up? -how much have you been bothered by bodily pain? not   -Have you noticed any hearing difficulties? no   -has your physical and emotional health limited your social activities with family or friends? no    Emotional Health Questions   -Do you have a history of depression, anxiety, or emotional problems? no  -Over the past 2 weeks, have you felt down, depressed or hopeless? no  -Over the past 2 weeks, have you felt little interest or pleasure in doing things? no    Health Habits   Please describe your diet habits:   Do you get 5 servings of fruits or vegetables daily? no  Do you exercise regularly? no    Activities of Daily Living and Functional Status   -Do you need help with eating, walking, dressing, bathing, toileting, the phone, transportation, shopping, preparing meals, housework, laundry, medications or managing money? no  -In the past four weeks, was someone available to help you if you needed and wanted help with anything? no  -Are you confident are you that you can control and manage most of your health problems? no  -Have you been given information to help you keep track of your medications? no  -How often do you have trouble taking your medications as prescribed? never    Fall Risk and Home Safety   Have you fallen 2 or more times in the past year?  no  Does your home have rugs in the hallways? no, Do you have grab bars in the bathrooms?  no, Does your home have handrails on the stairs? no, Do you have adequate lighting in your home? no  Do you have smoke detectors and check them regularly? no  Do you have difficulties driving a car/vehicle? no  Do you always fasten your seat belt when you are in a car? no

## 2019-10-08 NOTE — PATIENT INSTRUCTIONS
Medicare Wellness Visit, Male The best way to live healthy is to have a lifestyle where you eat a well-balanced diet, exercise regularly, limit alcohol use, and quit all forms of tobacco/nicotine, if applicable. Regular preventive services are another way to keep healthy. Preventive services (vaccines, screening tests, monitoring & exams) can help personalize your care plan, which helps you manage your own care. Screening tests can find health problems at the earliest stages, when they are easiest to treat. [address] [facility] follows the current, evidence-based guidelines published by the [facility] ([facility]) when recommending preventive services for our patients. Because we follow these guidelines, sometimes recommendations change over time as research supports it. (For example, a prostate screening blood test is no longer routinely recommended for men with no symptoms.) Of course, you and your doctor may decide to screen more often for some diseases, based on your risk and co-morbidities (chronic disease you are already diagnosed with). Preventive services for you include: - Medicare offers their members a free annual wellness visit, which is time for you and your primary care provider to discuss and plan for your preventive service needs. Take advantage of this benefit every year! 
-All adults over age 72 should receive the recommended pneumonia vaccines. Current USPSTF guidelines recommend a series of two vaccines for the best pneumonia protection.  
-All adults should have a flu vaccine yearly and an ECG.  All adults age 61 and older should receive a shingles vaccine once in their lifetime.   
-All adults age 38-68 who are overweight should have a diabetes screening test once every three years.  
-Other screening tests & preventive services for persons with diabetes include: an eye exam to screen for diabetic retinopathy, a kidney function test, a foot exam, and stricter control over your cholesterol.  
-Cardiovascular screening for adults with routine risk involves an electrocardiogram (ECG) at intervals determined by the provider.  
-Colorectal cancer screening should be done for adults age 54-65 with no increased risk factors for colorectal cancer. There are a number of acceptable methods of screening for this type of cancer. Each test has its own benefits and drawbacks. Discuss with your provider what is most appropriate for you during your annual wellness visit. The different tests include: colonoscopy (considered the best screening method), a fecal occult blood test, a fecal DNA test, and sigmoidoscopy. 
-All adults born between [facility] should be screened once for Hepatitis C. 
-An Abdominal Aortic Aneurysm (AAA) Screening is recommended for men age 73-68 who has ever smoked in their lifetime. Here is a list of your current Health Maintenance items (your personalized list of preventive services) with a due date: 
Health Maintenance Due Topic Date Due  Stool testing for trace blood  12/21/2002  Shingles Vaccine (2 of 2) 11/20/2018  Flu Vaccine  08/01/2019 [place] Annual Well Visit  09/26/2019 Vaccine Information Statement Recombinant Zoster (Shingles) Vaccine, RZV: What you need to know Many Vaccine Information Statements are available in [place] and other languages. See www.immunize.org/vis Hojas de Información Sobre Vacunas están disponibles en Español y en muchos otros idiomas. Visite [email or web address] 1. Why get vaccinated? Shingles (also called herpes zoster, or just zoster) is a painful skin rash, often with blisters. Shingles is caused by the varicella zoster virus, the same virus that causes chickenpox. After you have chickenpox, the virus stays in your body and can cause shingles later in life. You cant catch shingles from another person.  However, a person who has never had chickenpox (or chickenpox vaccine) could get chickenpox from someone with shingles. A shingles rash usually appears on one side of the face or body and heals within 2 to 4 weeks. Its main symptom is pain, which can be severe. Other symptoms can include fever, headache, chills, and upset stomach. Very rarely, a shingles infection can lead to pneumonia, hearing problems, blindness, brain inflammation (encephalitis), or death. For about 1 person in 5, severe pain can continue even long after the rash has cleared up. This long-lasting pain is called post-herpetic neuralgia (PHN). Shingles is far more common in people 48years of age and older than in younger people, and the risk increases with age. It is also more common in people whose immune system is weakened because of a disease such as cancer, or by drugs such as steroids or chemotherapy. At least 1 million people a year in the [facility] get shingles. 2. Shingles vaccine (recombinant) Recombinant shingles vaccine was approved by FDA in 2017 for the prevention of shingles. In clinical trials, it was more than 90% effective in preventing shingles. It can also reduce the likelihood of PHN. Two doses, 2 to 6 months apart, are recommended for adults 48 and older. This vaccine is also recommended for people who have already gotten the live shingles vaccine (Zostavax). There is no live virus in this vaccine. 3. Some people should not get this vaccine Tell your vaccine provider if you: 
 
 Have any severe, life-threatening allergies. A person who has ever had a life-threatening allergic reaction after a dose of recombinant shingles vaccine, or has a severe allergy to any component of this vaccine, may be advised not to be vaccinated. Ask your health care provider if you want information about vaccine components.  Are pregnant or breastfeeding.  There is not much information about use of recombinant shingles vaccine in pregnant or nursing women. Your healthcare provider might recommend delaying vaccination.  Are not feeling well. If you have a mild illness, such as a cold, you can probably get the vaccine today. If you are moderately or severely ill, you should probably wait until you recover. Your doctor can advise you. 4. Risks of a vaccine reaction With any medicine, including vaccines, there is a chance of reactions. After recombinant shingles vaccination, a person might experience:  
 Pain, redness, soreness, or swelling at the site of the injection  Headache, muscle aches, fever, shivering, fatigue In clinical trials, most people got a sore arm with mild or moderate pain after vaccination, and some also had redness and swelling where they got the shot. Some people felt tired, had muscle pain, a headache, shivering, fever, stomach pain, or nausea. About 1 out of 6 people who got recombinant zoster vaccine experienced side effects that prevented them from doing regular activities. Symptoms went away on their own in about 2 to 3 days. Side effects were more common in younger people. You should still get the second dose of recombinant zoster vaccine even if you had one of these reactions after the first dose. Other things that could happen after this vaccine:  People sometimes faint after medical procedures, including vaccination. Sitting or lying down for about 15 minutes can help prevent fainting and injuries caused by a fall. Tell your provider if you feel dizzy or have vision changes or ringing in the ears.  Some people get shoulder pain that can be more severe and longer-lasting than routine soreness that can follow injections. This happens very rarely.  Any medication can cause a severe allergic reaction.  Such reactions to a vaccine are estimated at about 1 in a million doses, and would happen within a few minutes to a few hours after the vaccination. As with any medicine, there is a very remote chance of a vaccine causing a serious injury or death. The safety of vaccines is always being monitored. For more information, visit: www.cdc.gov/vaccinesafety/  
 
5. What if there is a serious problem? What should I look for?  Look for anything that concerns you, such as signs of a severe allergic reaction, very high fever, or unusual behavior. Signs of a severe allergic reaction can include hives, swelling of the face and throat, difficulty breathing, a fast heartbeat, dizziness, and weakness. These would usually start a few minutes to a few hours after the vaccination. What should I do?  If you think it is a severe allergic reaction or other emergency that cant wait, call 9-1-1 or get to the nearest hospital. Otherwise, call your health care provider. Afterward, the reaction should be reported to the Vaccine Adverse Event Reporting System (VAERS). Your doctor should file this report, or you can do it yourself through the VAERS website at www.vaers. [facility].gov, or by calling 0-621.135.9589. VAERS does not give medical advice. 6. How can I learn more?  Ask your health care provider. He or she can give you the vaccine package insert or suggest other sources of information.  Call your local or state health department.  Contact the Centers for Disease Control and Prevention (CDC): 
- Call 8-373.246.9870 (1-800-CDC-INFO) or 
- Visit CDCs website at www.cdc.gov/vaccines Vaccine Information Statement Recombinant Zoster Vaccine 2/12/2018 Department of [facility] and [employer] Centers for Disease Control and Prevention Office Use Only Prostate Cancer Screening: Care Instructions Your Care Instructions The prostate gland is an organ found just below a man's bladder. It is the size and shape of a walnut.  It surrounds the tube that carries urine from the bladder out of the body through the penis. This tube is called the urethra. Prostate cancer is the abnormal growth of cells in the prostate. It is the second most common type of cancer in men. (Skin cancer is the most common.) Most cases of prostate cancer occur in men older than 72. The disease runs in families. And it's more common in -American men. When it's found and treated early, prostate cancer may be cured. But it is not always treated. This is because prostate cancer may not shorten your life, especially if you are older and the cancer is growing slowly. Follow-up care is a key part of your treatment and safety. Be sure to make and go to all appointments, and call your doctor if you are having problems. It's also a good idea to know your test results and keep a list of the medicines you take. What are the screening tests for prostate cancer? The main screening test for prostate cancer is the prostate-specific antigen (PSA) test. This is a blood test that measures how much PSA is in your blood. A high level may mean that you have an enlargement, an infection, or cancer. Along with the PSA test, you may have a digital rectal exam. The digital (finger) rectal exam checks for anything abnormal in your prostate. To do the exam, the doctor puts a lubricated, gloved finger into your rectum. If these tests suggest cancer, you may need a prostate biopsy. How is prostate cancer diagnosed? In a biopsy, the doctor takes small tissue samples from your prostate gland. Another doctor then looks at the tissue under a microscope to see if there are cancer cells, signs of infection, or other problems. The results help diagnose prostate cancer. What are the pros and cons of screening? Neither a PSA test nor a digital rectal exam can tell you for sure that you do or do not have cancer. But they can help you decide if you need more tests, such as a prostate biopsy.  Screening tests may be useful because most men with prostate cancer don't have symptoms. It can be hard to know if you have cancer until it is more advanced. And then it's harder to treat. But having a PSA test can also cause harm. The test may show high levels of PSA that aren't caused by cancer. So you could have a prostate biopsy you didn't need. Or the PSA test might be normal when there is cancer, so a cancer might not be found early. The test can also find cancers that would never have caused a problem during your lifetime. So you might have treatment that was not needed. Prostate cancer usually develops late in life and grows slowly. For many men, it does not shorten their lives. Some experts advise screening only for men who are at high risk. Talk with your doctor to see if screening is right for you. Where can you learn more? Go to http://everett-mariann.info/. Enter R550 in the search box to learn more about \"Prostate Cancer Screening: Care Instructions. \" Current as of: December 19, 2018 Content Version: 12.2 © 7218-9550 [employer]. Care instructions adapted under license by [employer] (which disclaims liability or warranty for this information). If you have questions about a medical condition or this instruction, always ask your healthcare professional. [address] any warranty or liability for your use of this information. Colon Cancer Screening: Care Instructions Your Care Instructions Colorectal cancer occurs in the colon or rectum. That's the lower part of your digestive system. It is the second-leading cause of cancer deaths in the [place]. It often starts with small growths called polyps in the colon or rectum. Polyps are usually found with screening tests. Depending on the type of test, any polyps found may be removed during the tests.  
Colorectal cancer usually does not cause symptoms at first. But regular tests can help find it early, before it spreads and becomes harder to treat. Experts advise routine tests for colon cancer for people starting at age 48. And they advise people with a higher risk of colon cancer to get tested sooner. Talk with your doctor about when you should start testing. Discuss which tests you need. Follow-up care is a key part of your treatment and safety. Be sure to make and go to all appointments, and call your doctor if you are having problems. It's also a good idea to know your test results and keep a list of the medicines you take. What are the main screening tests for colon cancer? · Stool tests. These include the fecal immunochemical test (FIT) and the fecal occult blood test (FOBT). These tests check stool samples for signs of cancer. If your test is positive, you will need to have a colonoscopy. · Sigmoidoscopy. This test lets your doctor look at the lining of your rectum and the lowest part of your colon. Your doctor uses a lighted tube called a sigmoidoscope. This test can't find cancers or polyps in the upper part of your colon. In some cases, polyps that are found can be removed. But if your doctor finds polyps, you will need to have a colonoscopy to check the upper part of your colon. · Colonoscopy. This test lets your doctor look at the lining of your rectum and your entire colon. The doctor uses a thin, flexible tool called a colonoscope. It can also be used to remove polyps or get a tissue sample (biopsy). What tests do you need? The following guidelines are for people age 48 and over who are not at high risk for colorectal cancer. You may have at least one of these tests as directed by your doctor. · Fecal immunochemical test (FIT) or fecal occult blood test (FOBT) every year · Sigmoidoscopy every 5 years · Colonoscopy every 10 years If you are age 68 to 80, you can work with your doctor to decide if screening is a good option. If you are age 80 or older, your doctor will likely advise that screening is not helpful. Talk with your doctor about when you need to be tested. And discuss which tests are right for you. Your doctor may recommend earlier or more frequent testing if you: 
· Have had colorectal cancer before. · Have had colon polyps. · Have symptoms of colorectal cancer. These include blood in your stool and changes in your bowel habits. · Have a parent, brother or sister, or child with colon polyps or colorectal cancer. · Have a bowel disease. This includes ulcerative colitis and Crohn's disease. · Have a rare polyp syndrome that runs in families, such as familial adenomatous polyposis (FAP). · Have had radiation treatments to the belly or pelvis. When should you call for help? Watch closely for changes in your health, and be sure to contact your doctor if: 
  · You have any changes in your bowel habits.  
  · You have any problems. Where can you learn more? Go to http://everett-mariann.info/. Enter M541 in the search box to learn more about \"Colon Cancer Screening: Care Instructions. \" Current as of: March 28, 2018 Content Version: 11.8 © 1346-3962 [employer]. Care instructions adapted under license by [employer] (which disclaims liability or warranty for this information). If you have questions about a medical condition or this instruction, always ask your healthcare professional. [place] [address] any warranty or liability for your use of this information. Advance Care Planning: Care Instructions Your Care Instructions It can be hard to live with an illness that cannot be cured. But if your health is getting worse, you may want to make decisions about end-of-life care. Planning for the end of your life does not mean that you are giving up. It is a way to make sure that your wishes are met.  Clearly stating your wishes can make it easier for your loved ones. Making plans while you are still able may also ease your mind and make your final days less stressful and more meaningful. Follow-up care is a key part of your treatment and safety. Be sure to make and go to all appointments, and call your doctor if you are having problems. It's also a good idea to know your test results and keep a list of the medicines you take. What can you do to plan for the end of life? · You can bring these issues up with your doctor. You do not need to wait until your doctor starts the conversation. You might start with \"I would not be willing to live with . Criselda Kevin \" When you complete this sentence it helps your doctor understand your wishes. · Talk openly and honestly with your doctor. This is the best way to understand the decisions you will need to make as your health changes. Know that you can always change your mind. · Ask your doctor about commonly used life-support measures. These include tube feedings, breathing machines, and fluids given through a vein (IV). Understanding these treatments will help you decide whether you want them. · You may choose to have these life-supporting treatments for a limited time. This allows a trial period to see whether they will help you. You may also decide that you want your doctor to take only certain measures to keep you alive. It is important to spell out these conditions so that your doctor and family understand them. · Talk to your doctor about how long you are likely to live. He or she may be able to give you an idea of what usually happens with your specific illness. · Think about preparing papers that state your wishes. This way there will not be any confusion about what you want. You can change your instructions at any time. Which papers should you prepare?  
Advance directives are legal papers that tell doctors how you want to be cared for at the end of your life. You do not need a  to write these papers. Ask your doctor or your [facility] department for information on how to write your advance directives. They may have the forms for each of these types of papers. Make sure your doctor has a copy of these on file, and give a copy to a family member or close friend. · Consider a do-not-resuscitate order (DNR). This order asks that no extra treatments be done if your heart stops or you stop breathing. Extra treatments may include cardiopulmonary resuscitation (CPR), electrical shock to restart your heart, or a machine to breathe for you. If you decide to have a DNR order, ask your doctor to explain and write it. Place the order in your home where everyone can easily see it. · Consider a living will. A living will explains your wishes about life support and other treatments at the end of your life if you become unable to speak for yourself. Living cota tell doctors to use or not use treatments that would keep you alive. You must have one or two witnesses or a notary present when you sign this form. · Consider a durable power of  for health care. This allows you to name a person to make decisions about your care if you are not able to. Most people ask a close friend or family member. Talk to this person about the kinds of treatments you want and those that you do not want. Make sure this person understands your wishes. These legal papers are simple to change. Tell your doctor what you want to change, and ask him or her to make a note in your medical file. Give your family updated copies of the papers. Where can you learn more? Go to http://everett-mariann.info/. Enter P184 in the search box to learn more about \"Advance Care Planning: Care Instructions. \" Current as of: November 17, 2016 Content Version: 11.3 © 7093-3343 [employer].  Care instructions adapted under license by [address] (which disclaims liability or warranty for this information). If you have questions about a medical condition or this instruction, always ask your healthcare professional. [address] any warranty or liability for your use of this information. Preventing Falls: Care Instructions Your Care Instructions Getting around your home safely can be a challenge if you have injuries or health problems that make it easy for you to fall. Loose rugs and furniture in walkways are among the dangers for many older people who have problems walking or who have poor eyesight. People who have conditions such as arthritis, osteoporosis, or dementia also have to be careful not to fall. You can make your home safer with a few simple measures. Follow-up care is a key part of your treatment and safety. Be sure to make and go to all appointments, and call your doctor if you are having problems. It's also a good idea to know your test results and keep a list of the medicines you take. How can you care for yourself at home? Taking care of yourself · You may get dizzy if you do not drink enough water. To prevent dehydration, drink plenty of fluids, enough so that your urine is light yellow or clear like water. Choose water and other caffeine-free clear liquids. If you have kidney, heart, or liver disease and have to limit fluids, talk with your doctor before you increase the amount of fluids you drink. · Exercise regularly to improve your strength, muscle tone, and balance. Walk if you can. Swimming may be a good choice if you cannot walk easily. · Have your vision and hearing checked each year or any time you notice a change. If you have trouble seeing and hearing, you might not be able to avoid objects and could lose your balance. · Know the side effects of the medicines you take. Ask your doctor or pharmacist whether the medicines you take can affect your balance. Sleeping pills or sedatives can affect your balance. · Limit the amount of alcohol you drink. Alcohol can impair your balance and other senses. · Ask your doctor whether calluses or corns on your feet need to be removed. If you wear loose-fitting shoes because of calluses or corns, you can lose your balance and fall. · Talk to your doctor if you have numbness in your feet. Preventing falls at home · Remove raised doorway thresholds, throw rugs, and clutter. Repair loose carpet or raised areas in the floor. · Move furniture and electrical cords to keep them out of walking paths. · Use nonskid floor wax, and wipe up spills right away, especially on ceramic tile floors. · If you use a walker or cane, put rubber tips on it. If you use crutches, clean the bottoms of them regularly with an abrasive pad, such as steel wool. · Keep your house well lit, especially [facility], and outside walkways. Use night-lights in areas such as hallways and bathrooms. Add extra light switches or use remote switches (such as switches that go on or off when you clap your hands) to make it easier to turn lights on if you have to get up during the night. · Install sturdy handrails on stairways. · Move items in your cabinets so that the things you use a lot are on the lower shelves (about waist level). · Keep a cordless phone and a flashlight with new batteries by your bed. If possible, put a phone in each of the main rooms of your house, or carry a cell phone in case you fall and cannot reach a phone. Or, you can wear a device around your neck or wrist. You push a button that sends a signal for help. · Wear low-heeled shoes that fit well and give your feet good support. Use footwear with nonskid soles. Check the heels and soles of your shoes for wear. Repair or replace worn heels or soles. · Do not wear socks without shoes on wood floors. · Walk on the grass when the sidewalks are slippery.  If you live in an area that gets snow and ice in the winter, sprinkle salt on slippery steps and sidewalks. Preventing falls in the bath · Install grab bars and nonskid mats inside and outside your shower or tub and near the toilet and sinks. · Use shower chairs and bath benches. · Use a hand-held shower head that will allow you to sit while showering. · Get into a tub or shower by putting the weaker leg in first. Get out of a tub or shower with your strong side first. 
· Repair loose toilet seats and consider installing a raised toilet seat to make getting on and off the toilet easier. · Keep your bathroom door unlocked while you are in the shower. Where can you learn more? Go to http://everett-mariann.info/. Enter [phone] in the search box to learn more about \"Preventing Falls: Care Instructions. \" Current as of: March 16, 2018 Content Version: 11.8 © 6473-1346 Healthwise, [facility]. Care instructions adapted under license by [employer] (which disclaims liability or warranty for this information). If you have questions about a medical condition or this instruction, always ask your healthcare professional. [place] [address] any warranty or liability for your use of this information.

## 2019-10-08 NOTE — PROGRESS NOTES
Sabas Mejia Monday  79 y.o. male  1950  [address]  [id number]   [address]: Progress Note  Ann Cervantes MD       Encounter Date: 10/8/2019    Chief Complaint   Patient presents with    Annual Wellness Visit     History of Present Illness   Sabas Mejia Monday is a 79 y.o. male who presents to clinic today for the Subsequent Medicare Annual Wellness Visit providing Personalized Prevention Plan Services (PPPS) (Performed 12 months after initial AWV and PPPS ) . Concerns today   (Patient understands that medical problems addressed today may incur additional cost as this is a preventive visit)  Hx of Prediabetes. Due for follow-up A1c. Recently seen 9/20 and 9/30 and diagnosed with CAP of right middle lobe. Sx improving. Completed antibiotic treatment. Specialists/Care Team   Sabas Mejia. Monday has established care with the following healthcare providers:  Patient Care Team:  Jerry Hennessy MD as PCP - General (Family Practice)  Jerry Hennessy MD as PCP - [facility] Empaneled Provider  Denise Harmon MD (Neurology)      Depression Risk Factor Screening:     3 most recent PHQ Screens 10/8/2019   Little interest or pleasure in doing things Not at all   Feeling down, depressed, irritable, or hopeless Not at all   Total Score PHQ 2 0         Alcohol Risk Factor Screening: You do not drink alcohol or very rarely. Functional Ability and Level of Safety:     Hearing Loss   Hearing is good. Activities of Daily Living   Self-care. Requires assistance with: no ADLs    Fall Risk     Fall Risk Assessment, last 12 mths 10/8/2019   Able to walk? Yes   Fall in past 12 months?  No   Fall with injury? -   Number of falls in past 12 months -   Fall Risk Score -       Patient is not abused      Advice/Referrals/Counseling (as indicated)   Education and counseling provided for any problems identified above: Diet and exercise    Preventive Services     Immunization History   Administered Date(s) Administered    Pneumococcal Polysaccharide (PPSV-23) 08/21/2019    Tdap 08/30/2018    Zoster Recombinant 09/25/2018       (Preventive care checklist to be included in patient instructions)  Discussed today Done Previously Not Needed     X  Pneumococcal vaccines   X   Flu vaccine     X Hepatitis B vaccine (if at risk)   X   Shingles vaccine    X  TDAP vaccine     X Digital rectal exam   X   PSA   X   Colorectal cancer screening     X Low-dose CT for lung cancer screening     X Bone density test   X   Glaucoma screening    X  Cholesterol test    X  Diabetes screening test      X Diabetes self-management class     X Nutritionist referral for diabetes or renal disease     Discussion of Advance Directive   Discussed with Allen Marielos Monday his ability to prepare and advance directive in the case that an injury or illness causes him to be unable to make health care decisions. Review of Systems   General: Denies fevers, chills, confusion  HEENT: Denies congestion, rhinorrhea, sore throat, ear pain  Cardiac: Denies chest pain, palpitations, dyspnea on exertion  Pulmonary: Denies shortness of breath, wheezing, cough  Abdominal: Denies abdominal pain, nausea, vomiting, diarrhea or constipation  : Denies dysuria, hematuria  MSK: Denies musculoskeletal or joint pain  Skin: Denies rash  Psych: Denies depression or anxiety  Vitals/Objective:     Vitals:    10/08/19 0857   BP: (!) 132/91   Pulse: 67   Resp: 17   Temp: 98 °F (36.7 °C)   TempSrc: Oral   SpO2: 98%   Weight: 239 lb (108.4 kg)   Height: 6' 3\" (1.905 m)     Body mass index is 29.87 kg/m². General: Patient alert and oriented and in NAD  HEENT: PER/EOMI, no conjunctival pallor or scleral icterus. No thyromegaly or cervical lymphadenopathy  Heart: Regular rate and rhythm, No murmurs, rubs or gallops.   2+ peripheral pulses  Lungs: Clear to auscultation bilaterally, no wheezing, rales or rhonchi  Abd: +BS, non-tender, non-distended  Ext: No edema  Skin: No rashes or lesions noted on exposed skin,  Psych: Appropriate mood and affect    Evaluation of Cognitive Function   Mood/affect:  neutral  Orientation: Person, Place, Time and Situation  Appearance: age appropriate  Family member/caregiver input: n/a          Assessment and Plan:   1. Medicare annual wellness visit, subsequent  Baylee Butt Monday was counseled on age-appropriate/ guideline-based risk prevention behaviors and screening for a 79y.o. year old   male . We also discussed adjustments in screening based on family history if necessary. Printed instructions for preventative screening guidelines and healthy behaviors given to patient with after visit summary. 2. Encounter for immunization  Declined flu vaccine. - varicella-zoster recombinant, PF, (SHINGRIX, PF,) 50 mcg/0.5 mL susr injection; 0.5 mL by IntraMUSCular route once for 1 dose. Repeat dose 2-6 months following the initial injection. Dispense: 0.5 mL; Refill: 1    3. Colon cancer screening  - OCCULT BLOOD IMMUNOASSAY,DIAGNOSTIC    4. Advanced directives, counseling/discussion  See ACP note    5. Screening for alcoholism  - CA ANNUAL ALCOHOL SCREEN 15 MIN    6. Screening for depression  - DEPRESSION SCREEN ANNUAL    7. Special screening for malignant neoplasm of prostate  The natural history of prostate cancer and ongoing controversy regarding screening and potential treatment outcomes of prostate cancer has been discussed with the patient. The meaning of a false positive PSA and a false negative PSA has been discussed. He indicates understanding of the limitations of this screening test and wishes to proceed with screening PSA testing.    - PSA SCREENING (SCREENING)    8. Prediabetes  - HEMOGLOBIN A1C WITH EAG    9. Pneumonia of right middle lobe due to infectious organism [facility])  Will get follow-up CXR to ensure improvement/resolution of PNA.    - XR CHEST PA LAT; Future      I have discussed the diagnosis with the patient and the intended plan as seen in the above orders. he has expressed understanding. The patient has received an after-visit summary and questions were answered concerning future plans. I have discussed medication side effects and warnings with the patient as well. Follow-up and Dispositions  ·   Return in about 3 months (around 1/8/2020). Electronically Signed: Sylvie Schilder, MD     History   Patients past medical, surgical and family histories were reviewed and updated. Past Medical History:   Diagnosis Date    Asthma     Atrial fibrillation (HCC)     Chronic obstructive pulmonary disease (HCC)     HTN (hypertension)     Hypercholesterolemia      History reviewed. No pertinent surgical history.   Family History   Problem Relation Age of Onset    Diabetes Mother     Hypertension Mother     Cancer Father         stomach     Social History     Socioeconomic History    Marital status:      Spouse name: Not on file    Number of children: Not on file    Years of education: Not on file    Highest education level: Not on file   Occupational History    Not on file   Social Needs    Financial resource strain: Not on file    Food insecurity:     Worry: Not on file     Inability: Not on file    Transportation needs:     Medical: Not on file     Non-medical: Not on file   Tobacco Use    Smoking status: Never Smoker    Smokeless tobacco: Never Used   Substance and Sexual Activity    Alcohol use: No    Drug use: No    Sexual activity: Not on file   Lifestyle    Physical activity:     Days per week: Not on file     Minutes per session: Not on file    Stress: Not on file   Relationships    Social connections:     Talks on phone: Not on file     Gets together: Not on file     Attends Evangelical service: Not on file     Active member of club or organization: Not on file     Attends meetings of clubs or organizations: Not on file     Relationship status: Not on file    Intimate partner violence:     Fear of current or ex partner: Not on file     Emotionally abused: Not on file     Physically abused: Not on file     Forced sexual activity: Not on file   Other Topics Concern    Not on file   Social History Narrative    Not on file            Current Medications/Allergies     Current Outpatient Medications   Medication Sig Dispense Refill    varicella-zoster recombinant, PF, (SHINGRIX, PF,) 50 mcg/0.5 mL susr injection 0.5 mL by IntraMUSCular route once for 1 dose. Repeat dose 2-6 months following the initial injection. 0.5 mL 1    guaiFENesin ER (MUCINEX) 600 mg ER tablet Take 1 Tab by mouth two (2) times a day. 30 Tab 0    amLODIPine (NORVASC) 5 mg tablet Take 1 Tab by mouth daily. 90 Tab 3    mometasone-formoterol (DULERA) 100-5 mcg/actuation HFA inhaler Take 2 Puffs by inhalation two (2) times a day. 3 Inhaler 3    pravastatin (PRAVACHOL) 10 mg tablet Take 1 Tab by mouth nightly. 90 Tab 3    apixaban (ELIQUIS) 5 mg tablet Take 5 mg by mouth daily.  metoprolol tartrate (LOPRESSOR) 100 mg IR tablet Take 100 mg by mouth daily.  albuterol (PROAIR HFA) 90 mcg/actuation inhaler Take 2 Puffs by inhalation every four (4) hours as needed for Wheezing.  albuterol-ipratropium (DUO-NEB) 2.5 mg-0.5 mg/3 ml nebu 3 mL by Nebulization route.  azithromycin (ZITHROMAX) 250 mg tablet 2 tablets first day then  1 tablet daily for 4 days.  6 Tab 0     No Known Allergies

## 2019-10-08 NOTE — ACP (ADVANCE CARE PLANNING)
Advance Care Planning    Advance Care Planning (ACP) Provider Conversation Snapshot    Date of ACP Conversation: 11/14/19  Persons included in Conversation:  patient  Length of ACP Conversation in minutes:  <16 minutes (Non-Billable)    Authorized Decision Maker (if patient is incapable of making informed decisions): This person is:    Other Legally Authorized Decision Maker (e.g. Next of Kin)         For Patients with Decision Making Capacity:   Values/Goals: Exploration of values, goals, and preferences if recovery is not expected, even with continued medical treatment in the event of:  Imminent death  Severe, permanent brain injury    Conversation Outcomes / Follow-Up Plan:   Recommended completion of Advance Directive form after review of ACP materials and conversation with prospective healthcare agent   Recommended communicating the plan and making copies for the healthcare agent, personal physician, and others as appropriate (e.g., health system)    Full Code    Extended Emergency Contact Information  Primary Emergency Contact: [address] [place] [address] Phone: [phone]  Relation: Spouse

## 2019-10-09 LAB
EST. AVERAGE GLUCOSE BLD GHB EST-MCNC: 126 MG/DL
HBA1C MFR BLD: 6 % (ref 4.8–5.6)
PSA SERPL-MCNC: 0.7 NG/ML (ref 0–4)

## 2019-11-22 ENCOUNTER — TELEPHONE (OUTPATIENT)
Dept: FAMILY MEDICINE CLINIC | Age: 69
End: 2019-11-22

## 2019-11-22 NOTE — TELEPHONE ENCOUNTER
Marcus Perea, wife, on hippa    [phone]    She called for the order for nebulizer tubing. He tried to use it this morning and it will not work. She also said most offices have this in house as she said other offices did in the past for the patient.

## 2019-11-25 NOTE — TELEPHONE ENCOUNTER
I called and left a message stating that the nebulizer tubing is for in clinic use only and unable to give these supplies to patients without being seen. I indicated that if there were any additional questions to call us back here at clinic.

## 2019-12-16 RX ORDER — PRAVASTATIN SODIUM 10 MG/1
TABLET ORAL
Qty: 90 TAB | Refills: 3 | Status: SHIPPED | OUTPATIENT
Start: 2019-12-16 | End: 2020-12-16

## 2020-01-16 ENCOUNTER — OFFICE VISIT (OUTPATIENT)
Dept: FAMILY MEDICINE CLINIC | Age: 70
End: 2020-01-16

## 2020-01-16 ENCOUNTER — TELEPHONE (OUTPATIENT)
Dept: FAMILY MEDICINE CLINIC | Age: 70
End: 2020-01-16

## 2020-01-16 NOTE — TELEPHONE ENCOUNTER
Mayra Stevens is calling to get a Nebulizer Kit and patient has the machine and medications but they need the tubing and he's requesting an order and states they don't fax anything over for this?     Please advise thanks

## 2020-01-17 NOTE — TELEPHONE ENCOUNTER
Sally Bsuh is calling back checking on the status and following through of this tubing PA needed for this patient    He was seen yesterday:   Thursday, January 16, 2020 02:30 PM 3 [facility]-MAIN OFFICE, JUDY[facility], Acute Care, 20min.   tubing needed for nebulizer    Please advise thanks

## 2020-05-26 DIAGNOSIS — J41.0 SIMPLE CHRONIC BRONCHITIS (HCC): Chronic | ICD-10-CM

## 2020-05-28 RX ORDER — MOMETASONE FUROATE AND FORMOTEROL FUMARATE DIHYDRATE 100; 5 UG/1; UG/1
AEROSOL RESPIRATORY (INHALATION)
Qty: 1 INHALER | Refills: 11 | Status: SHIPPED | OUTPATIENT
Start: 2020-05-28 | End: 2021-06-09

## 2020-12-16 RX ORDER — PRAVASTATIN SODIUM 10 MG/1
TABLET ORAL
Qty: 90 TAB | Refills: 3 | Status: SHIPPED | OUTPATIENT
Start: 2020-12-16 | End: 2021-01-29

## 2020-12-17 DIAGNOSIS — I10 ESSENTIAL HYPERTENSION: ICD-10-CM

## 2020-12-18 RX ORDER — AMLODIPINE BESYLATE 5 MG/1
TABLET ORAL
Qty: 90 TAB | Refills: 0 | Status: SHIPPED | OUTPATIENT
Start: 2020-12-18 | End: 2021-01-14

## 2020-12-18 NOTE — TELEPHONE ENCOUNTER
Refilling Amlodipine during pandemic, however virtual visit for BP follow up will be requested.  This patient also follows with Cardiology

## 2021-01-13 ENCOUNTER — TELEPHONE (OUTPATIENT)
Dept: FAMILY MEDICINE CLINIC | Age: 71
End: 2021-01-13

## 2021-01-13 NOTE — TELEPHONE ENCOUNTER
----- Message from [place] sent at 1/11/2021 10:37 AM EST -----  Regarding: Dr. Abdoul Thorpe  Appointment not available    Caller's first and last name and relationship to patient (if not the patient):  Luis Fernando Davidsujit Monday (wife)      Best contact number: [phone]      Preferred date and time: first available in office appt      Scheduled appointment date and time: n/a      Reason for appointment: Routine Follow Up for med refills      Details to clarify the request: No routine visits on provider's schedule      [place]

## 2021-01-21 NOTE — PROGRESS NOTES
Nelson Gipson Monday  78 y.o. male  1950  [address] [address]  [id number]    [phone] (home)      [address]:    Telephone Encounter  Keven Fernandez MD       Encounter Date: 1/22/2021 at 1:39 PM    Consent: Nelson Gipson Mondarian, who was seen by synchronous (real-time) audio only technology, and/or his healthcare decision maker, is aware that this patient-initiated, Telehealth encounter on 1/22/2021 is a billable service, with coverage as determined by his insurance carrier. He is aware that he may receive a bill and has provided verbal consent to proceed: Yes. Chief Complaint   Patient presents with    Medication Evaluation       History of Present Illness   Nelson Gipson Monday is a 78 y.o. male was evaluated by telephone. I communicated with the patient and/or health care decision maker about:    1. Afib-Dr. Keena Rodríguez (Cardiologist). Eliquis 5mg daily and metoprolol 100mg BID. Patient had a visit last week and they were advised to have labs done since it has been a long time. patinet noted that his Metoprolol used to be once daily and then it was increased to BID. Patient also noted that he is taking his eliquis daily-- not sure if his Cardiologist knows. Will check with bottle and Cardiologist.  2. HTN. Amlodipine and Metoprolol. 3. HLD. Pravastatin. Review of Systems   Review of Systems   Constitutional: Negative for chills and fever. HENT: Negative for congestion and sore throat. Respiratory: Negative for cough. Cardiovascular: Negative for chest pain, palpitations and leg swelling. Neurological: Negative for dizziness and headaches. Vitals/Objective:   General: Patient speaking in complete sentences without effort. Normal speech and cooperative. Due to this being a Virtual Check-in/Telephone evaluation, many elements of the physical examination are unable to be assessed. Assessment and Plan:    Total Time: minutes: 11-20 minutes    1. Chronic atrial fibrillation (HCC)  Currently on Eliquis 5mg DAILY, discussed that it is usually BID dosing. Asked patient to verify and let his cadiologist know and pt will call back. Continue metoprolol. No CP/SOB/palpitations today. - CBC W/O DIFF; Future  - METABOLIC PANEL, COMPREHENSIVE; Future  - TSH 3RD GENERATION; Future    2. Essential hypertension  Does not check BP at home. Reports no issues, had increased metoprolol at Cardiologist office recently. - CBC W/O DIFF; Future  - METABOLIC PANEL, COMPREHENSIVE; Future    3. Prediabetes  Lab Results   Component Value Date/Time    Hemoglobin A1c 6.0 (H) 10/08/2019 09:57 AM     Last A1c 6.0%. Diet controlled. Will recheck labs  - CBC W/O DIFF; Future  - METABOLIC PANEL, COMPREHENSIVE; Future  - HEMOGLOBIN A1C WITH EAG; Future    4. Hyperlipidemia, unspecified hyperlipidemia type  On pravastatin 10mg. Check labs. - METABOLIC PANEL, COMPREHENSIVE; Future  - LIPID PANEL; Future      Patient informed to follow up: labs    I affirm this is a Patient Initiated Episode with an Established Patient who has not had a related appointment within my department in the past 7 days or scheduled within the next 24 hours. Note: not billable if this call serves to triage the patient into an appointment for the relevant concern      Electronically Signed: Glory Lind MD  Providers location when delivering service: home    CPT:  34309 (5-10 minutes)  [phone] (11-20 minutes)  21  (21-30 minutes)    Medicare:  [address]      ICD-10-CM ICD-9-CM    1. Chronic atrial fibrillation (HCC)  I48.20 427.31 CBC W/O DIFF      METABOLIC PANEL, COMPREHENSIVE      TSH 3RD GENERATION   2. Essential hypertension  I10 401.9 CBC W/O DIFF      METABOLIC PANEL, COMPREHENSIVE   3. Prediabetes  R73.03 790.29 CBC W/O DIFF      METABOLIC PANEL, COMPREHENSIVE      HEMOGLOBIN A1C WITH EAG   4.  Hyperlipidemia, unspecified hyperlipidemia type  C07.1 101.7 METABOLIC PANEL, COMPREHENSIVE      LIPID PANEL       Pursuant to the emergency declaration under the [facility]5 waiver authority and the [employer] Act, this Virtual  Visit was conducted, with patient's consent, to reduce the patient's risk of exposure to COVID-19 and provide continuity of care for an established patient. History   Patients past medical, surgical and family histories were personally reviewed and updated. Past Medical History:   Diagnosis Date    Asthma     Atrial fibrillation (HCC)     Chronic obstructive pulmonary disease (HCC)     HTN (hypertension)     Hypercholesterolemia      No past surgical history on file. Family History   Problem Relation Age of Onset    Diabetes Mother     Hypertension Mother     Cancer Father         stomach     Social History     Tobacco Use    Smoking status: Never Smoker    Smokeless tobacco: Never Used   Substance Use Topics    Alcohol use: No    Drug use: No              Current Medications/Allergies   Medications and Allergies reviewed:    Current Outpatient Medications   Medication Sig Dispense Refill    amLODIPine (NORVASC) 5 mg tablet TAKE 1 TABLET BY MOUTH EVERY DAY 30 Tab 0    pravastatin (PRAVACHOL) 10 mg tablet TAKE 1 TABLET BY MOUTH EVERY DAY EVERY NIGHT 90 Tab 3    Dulera 100-5 mcg/actuation HFA inhaler TAKE 2 PUFFS BY INHALATION TWO (2) TIMES A DAY. 1 Inhaler 11    guaiFENesin ER (MUCINEX) 600 mg ER tablet Take 1 Tab by mouth two (2) times a day. 30 Tab 0    apixaban (ELIQUIS) 5 mg tablet Take 5 mg by mouth daily.  metoprolol tartrate (LOPRESSOR) 100 mg IR tablet Take 100 mg by mouth daily.  albuterol (PROAIR HFA) 90 mcg/actuation inhaler Take 2 Puffs by inhalation every four (4) hours as needed for Wheezing.  albuterol-ipratropium (DUO-NEB) 2.5 mg-0.5 mg/3 ml nebu 3 mL by Nebulization route.        No Known Allergies

## 2021-01-22 ENCOUNTER — VIRTUAL VISIT (OUTPATIENT)
Dept: FAMILY MEDICINE CLINIC | Age: 71
End: 2021-01-22
Payer: MEDICARE

## 2021-01-22 DIAGNOSIS — I48.20 CHRONIC ATRIAL FIBRILLATION (HCC): Primary | ICD-10-CM

## 2021-01-22 DIAGNOSIS — E78.5 HYPERLIPIDEMIA, UNSPECIFIED HYPERLIPIDEMIA TYPE: ICD-10-CM

## 2021-01-22 DIAGNOSIS — R73.03 PREDIABETES: ICD-10-CM

## 2021-01-22 DIAGNOSIS — I10 ESSENTIAL HYPERTENSION: ICD-10-CM

## 2021-01-22 PROCEDURE — 99442 PR PHYS/QHP TELEPHONE EVALUATION 11-20 MIN: CPT | Performed by: STUDENT IN AN ORGANIZED HEALTH CARE EDUCATION/TRAINING PROGRAM

## 2021-01-22 NOTE — PROGRESS NOTES
I reviewed the patient's medical history, the resident's findings on physical examination, the patient's diagnoses, and treatment plan as documented in the resident note. I concur with the treatment plan as documented. Vic Quinn

## 2021-01-26 ENCOUNTER — LAB ONLY (OUTPATIENT)
Dept: FAMILY MEDICINE CLINIC | Age: 71
End: 2021-01-26

## 2021-01-26 DIAGNOSIS — I48.20 CHRONIC ATRIAL FIBRILLATION (HCC): ICD-10-CM

## 2021-01-26 DIAGNOSIS — E78.5 HYPERLIPIDEMIA, UNSPECIFIED HYPERLIPIDEMIA TYPE: ICD-10-CM

## 2021-01-26 DIAGNOSIS — I10 ESSENTIAL HYPERTENSION: ICD-10-CM

## 2021-01-26 DIAGNOSIS — R73.03 PREDIABETES: ICD-10-CM

## 2021-01-26 LAB
ALBUMIN SERPL-MCNC: 4 G/DL (ref 3.5–5)
ALBUMIN/GLOB SERPL: 1.1 {RATIO} (ref 1.1–2.2)
ALP SERPL-CCNC: 137 U/L (ref 45–117)
ALT SERPL-CCNC: 30 U/L (ref 12–78)
ANION GAP SERPL CALC-SCNC: 8 MMOL/L (ref 5–15)
AST SERPL-CCNC: 23 U/L (ref 15–37)
BILIRUB SERPL-MCNC: 0.8 MG/DL (ref 0.2–1)
BUN SERPL-MCNC: 21 MG/DL (ref 6–20)
BUN/CREAT SERPL: 18 (ref 12–20)
CALCIUM SERPL-MCNC: 9.1 MG/DL (ref 8.5–10.1)
CHLORIDE SERPL-SCNC: 100 MMOL/L (ref 97–108)
CHOLEST SERPL-MCNC: 212 MG/DL
CO2 SERPL-SCNC: 31 MMOL/L (ref 21–32)
CREAT SERPL-MCNC: 1.19 MG/DL (ref 0.7–1.3)
ERYTHROCYTE [DISTWIDTH] IN BLOOD BY AUTOMATED COUNT: 13.6 % (ref 11.5–14.5)
EST. AVERAGE GLUCOSE BLD GHB EST-MCNC: 123 MG/DL
GLOBULIN SER CALC-MCNC: 3.6 G/DL (ref 2–4)
GLUCOSE SERPL-MCNC: 112 MG/DL (ref 65–100)
HBA1C MFR BLD: 5.9 % (ref 4–5.6)
HCT VFR BLD AUTO: 51.8 % (ref 36.6–50.3)
HDLC SERPL-MCNC: 49 MG/DL
HDLC SERPL: 4.3 {RATIO} (ref 0–5)
HGB BLD-MCNC: 16.4 G/DL (ref 12.1–17)
LDLC SERPL CALC-MCNC: 131.2 MG/DL (ref 0–100)
LIPID PROFILE,FLP: ABNORMAL
MCH RBC QN AUTO: 27.4 PG (ref 26–34)
MCHC RBC AUTO-ENTMCNC: 31.7 G/DL (ref 30–36.5)
MCV RBC AUTO: 86.5 FL (ref 80–99)
NRBC # BLD: 0 K/UL (ref 0–0.01)
NRBC BLD-RTO: 0 PER 100 WBC
PLATELET # BLD AUTO: 242 K/UL (ref 150–400)
PMV BLD AUTO: 11.4 FL (ref 8.9–12.9)
POTASSIUM SERPL-SCNC: 5.2 MMOL/L (ref 3.5–5.1)
PROT SERPL-MCNC: 7.6 G/DL (ref 6.4–8.2)
RBC # BLD AUTO: 5.99 M/UL (ref 4.1–5.7)
SODIUM SERPL-SCNC: 139 MMOL/L (ref 136–145)
TRIGL SERPL-MCNC: 159 MG/DL (ref ?–150)
TSH SERPL DL<=0.05 MIU/L-ACNC: 4.37 UIU/ML (ref 0.36–3.74)
VLDLC SERPL CALC-MCNC: 31.8 MG/DL
WBC # BLD AUTO: 7 K/UL (ref 4.1–11.1)

## 2021-01-29 ENCOUNTER — TELEPHONE (OUTPATIENT)
Dept: FAMILY MEDICINE CLINIC | Age: 71
End: 2021-01-29

## 2021-01-29 DIAGNOSIS — E78.5 HYPERLIPIDEMIA, UNSPECIFIED HYPERLIPIDEMIA TYPE: ICD-10-CM

## 2021-01-29 DIAGNOSIS — R74.8 ELEVATED ALKALINE PHOSPHATASE LEVEL: ICD-10-CM

## 2021-01-29 DIAGNOSIS — R79.89 ABNORMAL THYROID BLOOD TEST: Primary | ICD-10-CM

## 2021-01-29 RX ORDER — ATORVASTATIN CALCIUM 10 MG/1
10 TABLET, FILM COATED ORAL DAILY
Qty: 90 TAB | Refills: 0 | Status: SHIPPED | OUTPATIENT
Start: 2021-01-29 | End: 2021-07-01

## 2021-01-29 NOTE — TELEPHONE ENCOUNTER
Discussed labs    Based on 10year ASCVD risk score, will switch to Lipitor 10mg and increase based on tolerance    Abnormal TSH, will repeat and get Ft4 also

## 2021-01-29 NOTE — TELEPHONE ENCOUNTER
Patient seen on:  Virtual Visit    1/22/2021  [address] [place] [address] Medicine Ctr   Magnus Berry MD  Family Medicine  Debo Beckett MD  Family Medicine ([facility])  Chronic atrial fibrillation [facility]) +3 more  Dx  Medication Evaluation ;  Referred by Magnus Berry MD  Reason for Visit     Close enc    Thanks  Jadon Singleton S/PSR  [facility] Referral Coordinator

## 2021-02-05 ENCOUNTER — TELEPHONE (OUTPATIENT)
Dept: FAMILY MEDICINE CLINIC | Age: 71
End: 2021-02-05

## 2021-02-05 DIAGNOSIS — M54.50 CHRONIC MIDLINE LOW BACK PAIN WITHOUT SCIATICA: Primary | ICD-10-CM

## 2021-02-05 DIAGNOSIS — G89.29 CHRONIC MIDLINE LOW BACK PAIN WITHOUT SCIATICA: Primary | ICD-10-CM

## 2021-02-05 NOTE — TELEPHONE ENCOUNTER
----- Message from Sandro Stevenson sent at 1/29/2021  3:52 PM EST -----  Regarding: Dr. Coleman Tam Message/Vendor Calls    Caller's first and last name: Kathlee Dance Monday (wife)      Reason for call: Needs in office appointment to see Dr. Radha Rosenthal and for labs      Callback required yes/no and why: yes to schedule      Best contact number(s): [phone]      Details to clarify the request: pt wife stated Dr. Radha Rosenthal wanted to see him in office and there are no in office appointments.       Sandro Stevenson

## 2021-02-05 NOTE — TELEPHONE ENCOUNTER
Called and spoke with  Monday. She did not schedule any lab appt during this phone call.    (per your telephone encounter to schedule a recheck for lab work). She did not accept this as saying the patient needed to come into the office to see you, get an x-ray for back pain, and a referral to the spine specialist as the patient has already made an appt there for FEB 22 AT [place] [address]. I asked if this was discussed at the virtual visit of 1/22 and she said \"yes\". Please advise and have your nurse call her back to discuss your full response.

## 2021-02-18 NOTE — TELEPHONE ENCOUNTER
Called patient /pt's wife to clarified. They would like a referral to see Dr. Michelle Watson. Referral order placed. Discussed with them that I will also get scheduled for labs too.

## 2021-03-29 RX ORDER — PRAVASTATIN SODIUM 10 MG/1
TABLET ORAL
Qty: 90 TAB | Refills: 3 | Status: SHIPPED | OUTPATIENT
Start: 2021-03-29 | End: 2021-07-01 | Stop reason: ALTCHOICE

## 2021-05-04 DIAGNOSIS — E78.5 HYPERLIPIDEMIA, UNSPECIFIED HYPERLIPIDEMIA TYPE: ICD-10-CM

## 2021-07-01 RX ORDER — ATORVASTATIN CALCIUM 10 MG/1
TABLET, FILM COATED ORAL
Qty: 90 TABLET | Refills: 0 | Status: SHIPPED | OUTPATIENT
Start: 2021-07-01 | End: 2021-08-12

## 2021-07-07 DIAGNOSIS — J41.0 SIMPLE CHRONIC BRONCHITIS (HCC): Chronic | ICD-10-CM

## 2021-07-13 RX ORDER — MOMETASONE FUROATE AND FORMOTEROL FUMARATE DIHYDRATE 100; 5 UG/1; UG/1
AEROSOL RESPIRATORY (INHALATION)
Qty: 1 INHALER | Refills: 0 | Status: SHIPPED | OUTPATIENT
Start: 2021-07-13 | End: 2021-08-18

## 2021-07-13 NOTE — TELEPHONE ENCOUNTER
Called patient back and patient's spouse answered the phone. Advised her she was not on his release form, she then handed the phone to the patient. I advised him that I have sent his inhaler to the pharmacy on file and that he is due for a yearly visit. I scheduled him an appt on Monday 7/26/21 with Dr. Russell. I printed out appointment reminder and mailed it to his address on file.

## 2021-08-05 ENCOUNTER — OFFICE VISIT (OUTPATIENT)
Dept: FAMILY MEDICINE CLINIC | Age: 71
End: 2021-08-05
Payer: MEDICARE

## 2021-08-05 VITALS
BODY MASS INDEX: 31.48 KG/M2 | RESPIRATION RATE: 16 BRPM | OXYGEN SATURATION: 95 % | HEIGHT: 75 IN | TEMPERATURE: 98.2 F | HEART RATE: 89 BPM | SYSTOLIC BLOOD PRESSURE: 124 MMHG | DIASTOLIC BLOOD PRESSURE: 74 MMHG | WEIGHT: 253.2 LBS

## 2021-08-05 DIAGNOSIS — Z12.11 SCREEN FOR COLON CANCER: ICD-10-CM

## 2021-08-05 DIAGNOSIS — Z00.00 MEDICARE ANNUAL WELLNESS VISIT, SUBSEQUENT: Primary | ICD-10-CM

## 2021-08-05 DIAGNOSIS — Z13.31 SCREENING FOR DEPRESSION: ICD-10-CM

## 2021-08-05 DIAGNOSIS — Z13.39 SCREENING FOR ALCOHOLISM: ICD-10-CM

## 2021-08-05 PROCEDURE — G8432 DEP SCR NOT DOC, RNG: HCPCS | Performed by: STUDENT IN AN ORGANIZED HEALTH CARE EDUCATION/TRAINING PROGRAM

## 2021-08-05 PROCEDURE — G8427 DOCREV CUR MEDS BY ELIG CLIN: HCPCS | Performed by: STUDENT IN AN ORGANIZED HEALTH CARE EDUCATION/TRAINING PROGRAM

## 2021-08-05 PROCEDURE — 3017F COLORECTAL CA SCREEN DOC REV: CPT | Performed by: STUDENT IN AN ORGANIZED HEALTH CARE EDUCATION/TRAINING PROGRAM

## 2021-08-05 PROCEDURE — G8417 CALC BMI ABV UP PARAM F/U: HCPCS | Performed by: STUDENT IN AN ORGANIZED HEALTH CARE EDUCATION/TRAINING PROGRAM

## 2021-08-05 PROCEDURE — G8754 DIAS BP LESS 90: HCPCS | Performed by: STUDENT IN AN ORGANIZED HEALTH CARE EDUCATION/TRAINING PROGRAM

## 2021-08-05 PROCEDURE — G8752 SYS BP LESS 140: HCPCS | Performed by: STUDENT IN AN ORGANIZED HEALTH CARE EDUCATION/TRAINING PROGRAM

## 2021-08-05 PROCEDURE — G8536 NO DOC ELDER MAL SCRN: HCPCS | Performed by: STUDENT IN AN ORGANIZED HEALTH CARE EDUCATION/TRAINING PROGRAM

## 2021-08-05 PROCEDURE — G0439 PPPS, SUBSEQ VISIT: HCPCS | Performed by: STUDENT IN AN ORGANIZED HEALTH CARE EDUCATION/TRAINING PROGRAM

## 2021-08-05 PROCEDURE — 1101F PT FALLS ASSESS-DOCD LE1/YR: CPT | Performed by: STUDENT IN AN ORGANIZED HEALTH CARE EDUCATION/TRAINING PROGRAM

## 2021-08-05 NOTE — PROGRESS NOTES
This is the Subsequent Medicare Annual Wellness Exam, performed 12 months or more after the Initial AWV or the last Subsequent AWV    I have reviewed the patient's medical history in detail and updated the computerized patient record. Assessment/Plan   Education and counseling provided:  Colorectal cancer screening tests    1. Medicare annual wellness visit, subsequent  -     angromeo 3 15 MIN  2. Screening for depression  -     DEPRESSION SCREEN ANNUAL  3. Screening for alcoholism  4. Screen for colon cancer  -     OCCULT BLOOD IMMUNOASSAY,DIAGNOSTIC        Abuse Screen:  Patient is not abused     General Health Questions   -During the past 4 weeks:              -how would you rate your health in general? Fair              -how often have you been bothered by feeling dizzy when standing up? Never              -how much have you been bothered by bodily pain? Severely, suffers with back pain daily.              -Have you noticed any hearing difficulties? yes              -has your physical and emotional health limited your social activities with family or friends? no     Emotional Health Questions   -Do you have a history of depression, anxiety, or emotional problems? no  -Over the past 2 weeks, have you felt down, depressed or hopeless? no  -Over the past 2 weeks, have you felt little interest or pleasure in doing things? no     Health Habits   Please describe your diet habits: good  Do you get 5 servings of fruits or vegetables daily? no  Do you exercise regularly?  yes and I do a lot of walking      Activities of Daily Living and Functional Status   -Do you need help with eating, walking, dressing, bathing, toileting, the phone, transportation, shopping, preparing meals, housework, laundry, medications or managing money? no  -In the past four weeks, was someone available to help you if you needed and wanted help with anything? yes  -Are you confident are you that you can control and manage most of your health problems? yes  -Have you been given information to help you keep track of your medications? yes  -How often do you have trouble taking your medications as prescribed? never     Fall Risk and Home Safety   Have you fallen 2 or more times in the past year? no  Does your home have rugs in the hallways? no, Do you have grab bars in the bathrooms?  no, Does your home have handrails on the stairs? No stairs,house is 1 level. Do you have adequate lighting in your home? yes  Do you have smoke detectors and check them regularly? no  Do you have difficulties driving a car/vehicle? no  Do you always fasten your seat belt when you are in a car? yes      Health Maintenance Due     Health Maintenance Due   Topic Date Due    Colorectal Cancer Screening Combo  Never done       Patient Care Team   Patient Care Team:  Sam Carolina MD as PCP - General (Family Medicine)  Sam Carolina MD as PCP - [facility]  Cortes Tijerina MD (Neurology)    History     Patient Active Problem List   Diagnosis Code    Chronic atrial fibrillation [facility]) I48.20    Essential hypertension I10    Other hyperlipidemia E78.49    Prediabetes R73.03    Tremor of both hands R25.1    Basal cell carcinoma of skin of nose C44.311     Past Medical History:   Diagnosis Date    Asthma     Atrial fibrillation ([facility] [address] [facility]     Chronic obstructive pulmonary disease ([facility] [address] [facility]     HTN (hypertension)     Hypercholesterolemia       History reviewed. No pertinent surgical history. Current Outpatient Medications   Medication Sig Dispense Refill    Dulera 100-5 mcg/actuation HFA inhaler INHALE 2 PUFFS BY MOUTH TWICE A DAY 1 Inhaler 0    atorvastatin (LIPITOR) 10 mg tablet TAKE 1 TABLET BY MOUTH EVERY DAY 90 Tablet 0    amLODIPine (NORVASC) 5 mg tablet TAKE 1 TABLET BY MOUTH EVERY DAY 90 Tab 3    guaiFENesin ER (MUCINEX) 600 mg ER tablet Take 1 Tab by mouth two (2) times a day.  30 Tab 0    apixaban (ELIQUIS) 5 mg tablet Take 5 mg by mouth daily.  metoprolol tartrate (LOPRESSOR) 100 mg IR tablet Take 100 mg by mouth daily.  albuterol (PROAIR HFA) 90 mcg/actuation inhaler Take 2 Puffs by inhalation every four (4) hours as needed for Wheezing.  albuterol-ipratropium (DUO-NEB) 2.5 mg-0.5 mg/3 ml nebu 3 mL by Nebulization route. No Known Allergies    Family History   Problem Relation Age of Onset    Diabetes Mother     Hypertension Mother     Cancer Father         stomach     Social History     Tobacco Use    Smoking status: Never Smoker    Smokeless tobacco: Never Used   Substance Use Topics    Alcohol use: No     Discussed with patient the importance of colon cancer screening. Patient declined colonoscopy but agreed to occult blood immunoassay. We discussed risk and benefits and provided kit in clinic.      Patient discussed with Dr. Hoang Flores (Attending Physician)    Arnold Youngblood MD

## 2021-08-05 NOTE — PATIENT INSTRUCTIONS
Medicare Wellness Visit, Male    The best way to live healthy is to have a lifestyle where you eat a well-balanced diet, exercise regularly, limit alcohol use, and quit all forms of tobacco/nicotine, if applicable. Regular preventive services are another way to keep healthy. Preventive services (vaccines, screening tests, monitoring & exams) can help personalize your care plan, which helps you manage your own care. Screening tests can find health problems at the earliest stages, when they are easiest to treat. [facility] follows the current, evidence-based guidelines published by the [facility] ([facility]) when recommending preventive services for our patients. Because we follow these guidelines, sometimes recommendations change over time as research supports it. (For example, a prostate screening blood test is no longer routinely recommended for men with no symptoms). Of course, you and your doctor may decide to screen more often for some diseases, based on your risk and co-morbidities (chronic disease you are already diagnosed with). Preventive services for you include:  - Medicare offers their members a free annual wellness visit, which is time for you and your primary care provider to discuss and plan for your preventive service needs. Take advantage of this benefit every year!  -All adults over age 72 should receive the recommended pneumonia vaccines. Current USPSTF guidelines recommend a series of two vaccines for the best pneumonia protection.   -All adults should have a flu vaccine yearly and tetanus vaccine every 10 years.  -All adults age 48 and older should receive the shingles vaccines (series of two vaccines).        -All adults age 38-68 who are overweight should have a diabetes screening test once every three years.   -Other screening tests & preventive services for persons with diabetes include: an eye exam to screen for diabetic retinopathy, a kidney function test, a foot exam, and stricter control over your cholesterol.   -Cardiovascular screening for adults with routine risk involves an electrocardiogram (ECG) at intervals determined by the provider.   -Colorectal cancer screening should be done for adults age 54-65 with no increased risk factors for colorectal cancer. There are a number of acceptable methods of screening for this type of cancer. Each test has its own benefits and drawbacks. Discuss with your provider what is most appropriate for you during your annual wellness visit. The different tests include: colonoscopy (considered the best screening method), a fecal occult blood test, a fecal DNA test, and sigmoidoscopy.  -All adults born between [facility] should be screened once for Hepatitis C.  -An Abdominal Aortic Aneurysm (AAA) Screening is recommended for men age 73-68 who has ever smoked in their lifetime. Here is a list of your current Health Maintenance items (your personalized list of preventive services) with a due date:  Health Maintenance Due   Topic Date Due    Colorectal Screening  Never done          Colon Cancer Screening: Care Instructions  Your Care Instructions    Colorectal cancer occurs in the colon or rectum. That's the lower part of your digestive system. It is the second-leading cause of cancer deaths in the [place]. It often starts with small growths called polyps in the colon or rectum. Polyps are usually found with screening tests. Depending on the type of test, any polyps found may be removed during the tests. Colorectal cancer usually does not cause symptoms at first. But regular tests can help find it early, before it spreads and becomes harder to treat. Experts advise routine tests for colon cancer for people starting at age 48. And they advise people with a higher risk of colon cancer to get tested sooner. Talk with your doctor about when you should start testing.  Discuss which tests you need. Follow-up care is a key part of your treatment and safety. Be sure to make and go to all appointments, and call your doctor if you are having problems. It's also a good idea to know your test results and keep a list of the medicines you take. What are the main screening tests for colon cancer? · Stool tests. These include the fecal immunochemical test (FIT) and the fecal occult blood test (FOBT). These tests check stool samples for signs of cancer. If your test is positive, you will need to have a colonoscopy. · Sigmoidoscopy. This test lets your doctor look at the lining of your rectum and the lowest part of your colon. Your doctor uses a lighted tube called a sigmoidoscope. This test can't find cancers or polyps in the upper part of your colon. In some cases, polyps that are found can be removed. But if your doctor finds polyps, you will need to have a colonoscopy to check the upper part of your colon. · Colonoscopy. This test lets your doctor look at the lining of your rectum and your entire colon. The doctor uses a thin, flexible tool called a colonoscope. It can also be used to remove polyps or get a tissue sample (biopsy). What tests do you need? The following guidelines are for people age 48 and over who are not at high risk for colorectal cancer. You may have at least one of these tests as directed by your doctor. · Fecal immunochemical test (FIT) or fecal occult blood test (FOBT) every year  · Sigmoidoscopy every 5 years  · Colonoscopy every 10 years  If you are age 68 to 80, you can work with your doctor to decide if screening is a good option. If you are age 80 or older, your doctor will likely advise that screening is not helpful. Talk with your doctor about when you need to be tested. And discuss which tests are right for you. Your doctor may recommend earlier or more frequent testing if you:  · Have had colorectal cancer before. · Have had colon polyps.   · Have symptoms of colorectal cancer. These include blood in your stool and changes in your bowel habits. · Have a parent, brother or sister, or child with colon polyps or colorectal cancer. · Have a bowel disease. This includes ulcerative colitis and Crohn's disease. · Have a rare polyp syndrome that runs in families, such as familial adenomatous polyposis (FAP). · Have had radiation treatments to the belly or pelvis. When should you call for help? Watch closely for changes in your health, and be sure to contact your doctor if:    · You have any changes in your bowel habits.     · You have any problems. Where can you learn more? Go to http://www.gray.com/. Enter M541 in the search box to learn more about \"Colon Cancer Screening: Care Instructions. \"  Current as of: March 28, 2018  Content Version: 11.8  © 1507-4289 [employer]. Care instructions adapted under license by [employer] (which disclaims liability or warranty for this information). If you have questions about a medical condition or this instruction, always ask your healthcare professional. [place] [address] any warranty or liability for your use of this information. Medicare Wellness Visit, Male    The best way to live healthy is to have a lifestyle where you eat a well-balanced diet, exercise regularly, limit alcohol use, and quit all forms of tobacco/nicotine, if applicable. Regular preventive services are another way to keep healthy. Preventive services (vaccines, screening tests, monitoring & exams) can help personalize your care plan, which helps you manage your own care. Screening tests can find health problems at the earliest stages, when they are easiest to treat. [facility] follows the current, evidence-based guidelines published by the [place] [facility] [employer] (USPSTF) when recommending preventive services for our patients.  Because we follow these guidelines, sometimes recommendations change over time as research supports it. (For example, a prostate screening blood test is no longer routinely recommended for men with no symptoms). Of course, you and your doctor may decide to screen more often for some diseases, based on your risk and co-morbidities (chronic disease you are already diagnosed with). Preventive services for you include:  - Medicare offers their members a free annual wellness visit, which is time for you and your primary care provider to discuss and plan for your preventive service needs. Take advantage of this benefit every year!  -All adults over age 72 should receive the recommended pneumonia vaccines. Current USPSTF guidelines recommend a series of two vaccines for the best pneumonia protection.   -All adults should have a flu vaccine yearly and tetanus vaccine every 10 years.  -All adults age 48 and older should receive the shingles vaccines (series of two vaccines). -All adults age 38-68 who are overweight should have a diabetes screening test once every three years.   -Other screening tests & preventive services for persons with diabetes include: an eye exam to screen for diabetic retinopathy, a kidney function test, a foot exam, and stricter control over your cholesterol.   -Cardiovascular screening for adults with routine risk involves an electrocardiogram (ECG) at intervals determined by the provider.   -Colorectal cancer screening should be done for adults age 54-65 with no increased risk factors for colorectal cancer. There are a number of acceptable methods of screening for this type of cancer. Each test has its own benefits and drawbacks. Discuss with your provider what is most appropriate for you during your annual wellness visit.  The different tests include: colonoscopy (considered the best screening method), a fecal occult blood test, a fecal DNA test, and sigmoidoscopy.  -All adults born between [birth year] and 1963 should be screened once for Hepatitis C.  -An Abdominal Aortic Aneurysm (AAA) Screening is recommended for men age 73-68 who has ever smoked in their lifetime.      Here is a list of your current Health Maintenance items (your personalized list of preventive services) with a due date:  Health Maintenance Due   Topic Date Due    Colorectal Screening  Never done

## 2021-08-05 NOTE — ACP (ADVANCE CARE PLANNING)
Advance Care Planning     Advance Care Planning (ACP) Physician/NP/PA Conversation      Date of Conversation: 8/5/2021  Conducted with: Patient with Decision Making Capacity    Healthcare Decision Maker:     Click here to complete [address] including selection of the Healthcare Decision Maker Relationship (ie \"Primary\")    Care Preferences:    Hospitalization: \"If your health worsens and it becomes clear that your chance of recovery is unlikely, what would be your preference regarding hospitalization? \"  Patient would like to be hospitalized. Ventilation: \"If you were unable to breathe on your own and your chance of recovery was unlikely, what would be your preference about the use of a ventilator (breathing machine) if it was available to you? \"   The patient would NOT desire the use of a ventilator. Resuscitation: \"In the event your heart stopped as a result of an underlying serious health condition, would you want attempts to be made to restart your heart, or would you prefer a natural death? \"   The patient is unsure. Additional topics discussed: benefit/burden of treatment options    Conversation Outcomes / Follow-Up Plan:   ACP complete - no further action today  Reviewed DNR/DNI and patient is DNI (partial code) Patient is unsure about resuscitation. Patient states he would like to continue thinking about his decision.      Length of Voluntary ACP Conversation in minutes:  <16 minutes (Non-Billable)    Lilia Haynes MD

## 2021-08-05 NOTE — PROGRESS NOTES
Chief Complaint   Patient presents with   Kavya Zavala Annual Wellness Visit     Patient presents for routine annual medicare wellness exam.     Visit Vitals  /74 (BP 1 Location: Left upper arm, BP Patient Position: Sitting, BP Cuff Size: Large adult)   Pulse 89   Temp 98.2 °F (36.8 °C) (Oral)   Resp 16   Ht 6' 3\" (1.905 m)   Wt 253 lb 3.2 oz (114.9 kg)   SpO2 95%   BMI 31.65 kg/m²        1. Have you been to the ER, urgent care clinic since your last visit? Hospitalized since your last visit? No     2. Have you seen or consulted any other health care providers outside of the [address] [place] [address] since your last visit? Include any pap smears or colon screening. No      General Health Questions   -During the past 4 weeks:   -how would you rate your health in general? Fair   -how often have you been bothered by feeling dizzy when standing up? Never   -how much have you been bothered by bodily pain? Severely, suffers with back pain daily.   -Have you noticed any hearing difficulties? yes   -has your physical and emotional health limited your social activities with family or friends? no    Emotional Health Questions   -Do you have a history of depression, anxiety, or emotional problems? no  -Over the past 2 weeks, have you felt down, depressed or hopeless? no  -Over the past 2 weeks, have you felt little interest or pleasure in doing things? no    Health Habits   Please describe your diet habits: good  Do you get 5 servings of fruits or vegetables daily? no  Do you exercise regularly?  yes and I do a lot of walking     Activities of Daily Living and Functional Status   -Do you need help with eating, walking, dressing, bathing, toileting, the phone, transportation, shopping, preparing meals, housework, laundry, medications or managing money? no  -In the past four weeks, was someone available to help you if you needed and wanted help with anything? yes  -Are you confident are you that you can control and manage most of your health problems? yes  -Have you been given information to help you keep track of your medications? yes  -How often do you have trouble taking your medications as prescribed? never    Fall Risk and Home Safety   Have you fallen 2 or more times in the past year? no  Does your home have rugs in the hallways? no, Do you have grab bars in the bathrooms?  no, Does your home have handrails on the stairs? No stairs,house is 1 level.  Do you have adequate lighting in your home? yes  Do you have smoke detectors and check them regularly? no  Do you have difficulties driving a car/vehicle? no  Do you always fasten your seat belt when you are in a car? yes

## 2021-08-06 ENCOUNTER — OFFICE VISIT (OUTPATIENT)
Dept: FAMILY MEDICINE CLINIC | Age: 71
End: 2021-08-06

## 2021-08-06 VITALS
RESPIRATION RATE: 20 BRPM | WEIGHT: 254 LBS | DIASTOLIC BLOOD PRESSURE: 84 MMHG | BODY MASS INDEX: 31.58 KG/M2 | HEIGHT: 75 IN | SYSTOLIC BLOOD PRESSURE: 145 MMHG | OXYGEN SATURATION: 98 % | TEMPERATURE: 97.8 F | HEART RATE: 70 BPM

## 2021-08-06 DIAGNOSIS — M54.59 MECHANICAL LOW BACK PAIN: Primary | ICD-10-CM

## 2021-08-06 DIAGNOSIS — R26.9 GAIT DISTURBANCE: ICD-10-CM

## 2021-08-06 PROCEDURE — G8754 DIAS BP LESS 90: HCPCS | Performed by: FAMILY MEDICINE

## 2021-08-06 PROCEDURE — G8417 CALC BMI ABV UP PARAM F/U: HCPCS | Performed by: FAMILY MEDICINE

## 2021-08-06 PROCEDURE — 99214 OFFICE O/P EST MOD 30 MIN: CPT | Performed by: FAMILY MEDICINE

## 2021-08-06 PROCEDURE — 3017F COLORECTAL CA SCREEN DOC REV: CPT | Performed by: FAMILY MEDICINE

## 2021-08-06 PROCEDURE — G8432 DEP SCR NOT DOC, RNG: HCPCS | Performed by: FAMILY MEDICINE

## 2021-08-06 PROCEDURE — G8427 DOCREV CUR MEDS BY ELIG CLIN: HCPCS | Performed by: FAMILY MEDICINE

## 2021-08-06 PROCEDURE — G8753 SYS BP > OR = 140: HCPCS | Performed by: FAMILY MEDICINE

## 2021-08-06 PROCEDURE — G8536 NO DOC ELDER MAL SCRN: HCPCS | Performed by: FAMILY MEDICINE

## 2021-08-06 PROCEDURE — 1101F PT FALLS ASSESS-DOCD LE1/YR: CPT | Performed by: FAMILY MEDICINE

## 2021-08-06 NOTE — PROGRESS NOTES
[id number] [facility]      Chief Complaint: Low back pain    Subjective:   History: This patient is a 78 y.o. male with a chief complaint of low back pain. Pain is chronically intermittent. He reports seeing a pain specialist in the past.  He says he is taking a pain medication but unsure the name and not on his medication list.  Pain located in the center of the lower back and no radiating pain. No weakness or numbness in the legs. No bowel or bladder incontinence. No fever, night sweats, or weight changes. No saddle anesthesia. Review of Systems:  Per HPI    Past Medical History:   Diagnosis Date    Asthma     Atrial fibrillation ([facility] [address]     Chronic obstructive pulmonary disease (HCC)     HTN (hypertension)     Hypercholesterolemia      Family History   Problem Relation Age of Onset    Diabetes Mother     Hypertension Mother     Cancer Father         stomach     Current Outpatient Medications   Medication Sig Dispense Refill    Dulera 100-5 mcg/actuation HFA inhaler INHALE 2 PUFFS BY MOUTH TWICE A DAY 1 Inhaler 0    atorvastatin (LIPITOR) 10 mg tablet TAKE 1 TABLET BY MOUTH EVERY DAY 90 Tablet 0    amLODIPine (NORVASC) 5 mg tablet TAKE 1 TABLET BY MOUTH EVERY DAY 90 Tab 3    guaiFENesin ER (MUCINEX) 600 mg ER tablet Take 1 Tab by mouth two (2) times a day. 30 Tab 0    apixaban (ELIQUIS) 5 mg tablet Take 5 mg by mouth daily.  metoprolol tartrate (LOPRESSOR) 100 mg IR tablet Take 100 mg by mouth daily.  albuterol (PROAIR HFA) 90 mcg/actuation inhaler Take 2 Puffs by inhalation every four (4) hours as needed for Wheezing.  albuterol-ipratropium (DUO-NEB) 2.5 mg-0.5 mg/3 ml nebu 3 mL by Nebulization route.        No Known Allergies  Social History     Socioeconomic History    Marital status:      Spouse name: Not on file    Number of children: Not on file    Years of education: Not on file    Highest education level: Not on file   Occupational History    Not on file   Tobacco Use    Smoking status: Never Smoker    Smokeless tobacco: Never Used   Substance and Sexual Activity    Alcohol use: No    Drug use: No    Sexual activity: Not on file   Other Topics Concern    Not on file   Social History Narrative    Not on file     Social Determinants of Health     Financial Resource Strain:     Difficulty of Paying Living Expenses:    Food Insecurity:     Worried About Running Out of Food in the Last Year:     [address] in the Last Year:    Transportation Needs:     Lack of Transportation (Medical):  Lack of Transportation (Non-Medical):    Physical Activity:     Days of Exercise per Week:     Minutes of Exercise per Session:    Stress:     Feeling of Stress :    Social Connections:     Frequency of Communication with Friends and Family:     Frequency of Social Gatherings with Friends and Family:     Attends [employer] Services:     Active Member of Clubs or Organizations:     Attends Club or Organization Meetings:     Marital Status:    Intimate Partner Violence:     Fear of Current or Ex-Partner:     Emotionally Abused:     Physically Abused:     Sexually Abused:        Objective:     General: Alert and oriented and in no acute distress. Responds to all questions appropriately. LUNGS: Respirations unlabored. Skin: No obvious rash.   MSK:    Posture: Normal   Deformity: None    ROM: Limited in all directions     Gait: Spastic gait     Palpation:    L1-L5: Tenderness near L5    Sacrum: No tenderness    Coccyx: No tenderness    Left Paraspinal: No tenderness    Right Paraspinal: No tenderness     Strength (0-5/5)    Hip Flexion:   Left: 5/5  Right: 5/5    Hip Extension:  Left: 5/5  Right: 5/5    Hip Abduction:  Left: 5/5  Right: 5/5    Hip Adduction:  Left: 5/5  Right: 5/5    Knee Extension:  Left: 5/5  Right: 5/5    Knee Flexion:   Left: 5/5  Right: 5/5    Ankle dorsiflexion: Left: 5/5  Right: 5/5    Ankle plantarflexion:  Left: 5/5  Right: 5/5    Great toe extension:  Left: 5/5  Right: 5/5     Sensation: Intact, no deficits      Special test:    Straight leg: Left: Negative  Right: Negative      Imaging: Radiographs of the lumbar spine personally reviewed and demonstrates no obvious fracture or dislocation. Multilevel degenerative changes present. Assessment:       ICD-10-CM ICD-9-CM    1. Mechanical low back pain  M54.5 724.2 XR SPINE LUMB 2 OR 3 V     Low back pain likely related to DDD. Spastic gait. Chart review showed he has seen Dr. Clem Daugherty in Neurology for tremor in 2018 and was diagnosed with a Parkinson Type Tremor. He has not had follow up with neurology. Plan:   1. Home Exercise Program as per handout. 2. Ice 15 minutes, three times a day PRN and after exercise. Can alternate with heat for 15 minutes. 3.  Referred to Neurology for further evaluation of spastic gait and tremor and to evaluate for possible facet injection for low back pain. Medications:    1. Naproxin (Aleve): 220mg 1-2 tablets twice a day PRN. 2. Acetaminophen (Tylenol):  500mg 1-2 tablets every 6 hours as needed for pain.      RTC: PRN

## 2021-08-06 NOTE — PROGRESS NOTES
Chief Complaint   Patient presents with    Back Pain     lower back pain     1. Have you been to the ER, urgent care clinic since your last visit? Hospitalized since your last visit? no    2. Have you seen or consulted any other health care providers outside of the [address] [place] [address] since your last visit? Include any pap smears or colon screening.  no    Visit Vitals  BP (!) 145/84 (BP 1 Location: Left upper arm, BP Patient Position: Sitting)   Pulse 70   Temp 97.8 °F (36.6 °C) (Temporal)   Resp 20   Ht 6' 3\" (1.905 m)   Wt 254 lb (115.2 kg)   SpO2 98%   BMI 31.75 kg/m²

## 2021-08-11 ENCOUNTER — TELEPHONE (OUTPATIENT)
Dept: FAMILY MEDICINE CLINIC | Age: 71
End: 2021-08-11

## 2021-08-11 NOTE — TELEPHONE ENCOUNTER
Spoke with Karine to schedule appt. She said they were told that someone bradley be calling to schedule the appt after checking first with insurance to see if it will pay for it. Advised message would be sent.     No appt was scheduled

## 2021-08-11 NOTE — TELEPHONE ENCOUNTER
----- Message from [place] sent at 8/11/2021  9:28 AM EDT -----  Regarding: Dr. Akosua Thomas  General Message/Vendor Calls    Caller's first and last name:  Juan Hopkins Monday      Reason for call:  Requesting a call back to get status update on appt for injection.        Callback required yes/no and why:  yes      Best contact number(s):[phone]      Details to clarify the request:  Huber Nieves [facility]

## 2021-08-12 DIAGNOSIS — E78.5 HYPERLIPIDEMIA, UNSPECIFIED HYPERLIPIDEMIA TYPE: ICD-10-CM

## 2021-08-12 RX ORDER — ATORVASTATIN CALCIUM 10 MG/1
TABLET, FILM COATED ORAL
Qty: 90 TABLET | Refills: 0 | Status: SHIPPED | OUTPATIENT
Start: 2021-08-12

## 2021-08-17 ENCOUNTER — TELEPHONE (OUTPATIENT)
Dept: FAMILY MEDICINE CLINIC | Age: 71
End: 2021-08-17

## 2021-08-17 NOTE — TELEPHONE ENCOUNTER
----- Message from Roderick Allan sent at 8/17/2021  9:05 AM EDT -----  Regarding: Dr. Franky Nascimento      General Message/Vendor Calls    Caller's first and last name: Mrs. Elan Morton Monday      Reason for call: Pt still needs to be scheduled       Callback required yes/no and why: Yes to assist.      Best contact number(s):  [phone]        Details to clarify the request: Pt wife calling back in to advise she is still waiting to get pt scheduled for the injection in his back with Dr. Anna Raymond. Pt wife assumed they haven't gotten a call due to someone checking the insurance but was not told this by Dr. Rosalba Guardado.        Roderick Allan

## 2021-08-17 NOTE — TELEPHONE ENCOUNTER
----- Message from Ayaz Hatfield sent at 8/17/2021  9:02 AM EDT -----  Regarding: Dr. Susan Welch      Medication Refill    Caller (if not patient):  Monday      Relationship of caller (if not patient): Wife      Best contact number(s): [phone]      Name of medication and dosage if known: Walterine Thuan 100-5 mcg/actuation HFA inhaler       Is patient out of this medication (yes/no): No      Pharmacy name: [facility] [address] listed in chart? (yes/no): Yes   Pharmacy phone number: [phone]      Details to clarify the request: Pt has 28 puffs left.        Ayaz Hatfield

## 2021-08-19 LAB — HEMOCCULT STL QL IA: NEGATIVE

## 2021-09-02 ENCOUNTER — OFFICE VISIT (OUTPATIENT)
Dept: FAMILY MEDICINE CLINIC | Age: 71
End: 2021-09-02
Payer: MEDICARE

## 2021-09-02 VITALS
OXYGEN SATURATION: 96 % | RESPIRATION RATE: 18 BRPM | WEIGHT: 258 LBS | SYSTOLIC BLOOD PRESSURE: 128 MMHG | HEART RATE: 79 BPM | DIASTOLIC BLOOD PRESSURE: 93 MMHG | BODY MASS INDEX: 32.08 KG/M2 | TEMPERATURE: 97.1 F | HEIGHT: 75 IN

## 2021-09-02 DIAGNOSIS — M54.59 MECHANICAL LOW BACK PAIN: ICD-10-CM

## 2021-09-02 DIAGNOSIS — R26.9 ALTERED GAIT: ICD-10-CM

## 2021-09-02 DIAGNOSIS — M54.50 LUMBAR PAIN: Primary | ICD-10-CM

## 2021-09-02 PROCEDURE — 1101F PT FALLS ASSESS-DOCD LE1/YR: CPT | Performed by: FAMILY MEDICINE

## 2021-09-02 PROCEDURE — G8755 DIAS BP > OR = 90: HCPCS | Performed by: FAMILY MEDICINE

## 2021-09-02 PROCEDURE — 99213 OFFICE O/P EST LOW 20 MIN: CPT | Performed by: FAMILY MEDICINE

## 2021-09-02 PROCEDURE — G8536 NO DOC ELDER MAL SCRN: HCPCS | Performed by: FAMILY MEDICINE

## 2021-09-02 PROCEDURE — G8417 CALC BMI ABV UP PARAM F/U: HCPCS | Performed by: FAMILY MEDICINE

## 2021-09-02 PROCEDURE — G8752 SYS BP LESS 140: HCPCS | Performed by: FAMILY MEDICINE

## 2021-09-02 PROCEDURE — G8427 DOCREV CUR MEDS BY ELIG CLIN: HCPCS | Performed by: FAMILY MEDICINE

## 2021-09-02 PROCEDURE — G8432 DEP SCR NOT DOC, RNG: HCPCS | Performed by: FAMILY MEDICINE

## 2021-09-02 PROCEDURE — 3017F COLORECTAL CA SCREEN DOC REV: CPT | Performed by: FAMILY MEDICINE

## 2021-09-02 NOTE — PROGRESS NOTES
Soraida Almonte Monday is a 78 y.o. male    Chief Complaint   Patient presents with    LOW BACK PAIN     chronic low back pain. states he has intermittent sharp pain, but sometimes he has constant aching pain       1. Have you been to the ER, urgent care clinic since your last visit? Hospitalized since your last visit? No    2. Have you seen or consulted any other health care providers outside of the [address] [place] [address] since your last visit? Include any pap smears or colon screening. No      Visit Vitals  BP (!) 128/93 (BP 1 Location: Left upper arm, BP Patient Position: Sitting, BP Cuff Size: Large adult)   Pulse 79   Temp 97.1 °F (36.2 °C) (Temporal)   Resp 18   Ht 6' 3\" (1.905 m)   Wt 258 lb (117 kg)   SpO2 96%   BMI 32.25 kg/m²           Health Maintenance Due   Topic Date Due    Flu Vaccine (1) Never done         Medication Reconciliation completed, changes noted.   Please  Update medication list.

## 2021-09-02 NOTE — PROGRESS NOTES
[id number] [facility]      Chief Complaint:   Chief Complaint   Patient presents with    LOW BACK PAIN     chronic low back pain. states he has intermittent sharp pain, but sometimes he has constant aching pain       Subjective:   History: This patient is a 78 y.o. male with a chief complaint of back pain. Kaylah Harper Monday  is a very pleasant 78 y.o. M who comes in for evaluation of bilateral   lumbar spine pain that he says he has had for years without any specific injury or inciting event. The pain is located primarily in the midline L/S junction with/without radiating symptoms, constant. The patient's symptoms are aggravated by lifting objects and alleviated by rest. The patient denies any night pain, numbness/tingling, weakness, or bowel/bladder dysfunction. The patient has no other complaints at this time. Of note patient is poor historian however he states his gait has always been \"funny\". He denies any history of falls gait has been unchanged since he was a child. Denies family hx of neurological disease in parent and siblings    Prior Treatments:  Active Physical Therapy:  No  Attempted Modalities:  none tried  Injections:  no prior injections  Surgeries:  no prior surgery to the affected area  Medications:  none for pain      Review of Systems:  General/Constitutional:  No fever, chills, sweats, fatigue, night sweats, weakness, weight loss or weight gain   Head: No headache, no trauma   Neck: No swelling, masses, stiffness, pain, or limited movement   Cardiac: No chest pain   Respiratory: No cough, shortness of breath, or dyspnea on exertion   GI: No incontinence. No nausea/vomiting, diarrhea, abdominal pain, bloody or dark stools  : No incontinence. No change in urinary habits.   Peripheral Vascular: No edema, coldness, numbness, discoloration, pain, or paresthesias   Musculoskeletal: As per HPI  Neurological: No Subjective:      History was provided by the parents and patient was brought in for Constipation  .    History of Present Illness:  HPI   7 week old switched formula from Enfamil to GerberEase.  Gave Kristi syrup twice in past few days.  Planning on trying pear juice 1 ounce.    Review of Systems    Objective:     Physical Exam    Assessment:      No diagnosis found.     Plan:      ***    saddle distribution paresthesia. No siatic pain. No loss of consciousness, dizziness, seizures, dysarthria, cognitive changes, problems with balance, or unilateral weakness. Past Medical History:   Diagnosis Date    Asthma     Atrial fibrillation (HCC)     Chronic obstructive pulmonary disease (HCC)     HTN (hypertension)     Hypercholesterolemia      Family History   Problem Relation Age of Onset    Diabetes Mother     Hypertension Mother     Cancer Father         stomach     Current Outpatient Medications   Medication Sig Dispense Refill    Dulera 100-5 mcg/actuation HFA inhaler TAKE 2 PUFFS BY MOUTH TWICE A DAY 13 Inhaler 2    atorvastatin (LIPITOR) 10 mg tablet TAKE 1 TABLET BY MOUTH EVERY DAY 90 Tablet 0    diclofenac EC (VOLTAREN) 75 mg EC tablet TAKE 1 TABLET BY MOUTH TWICE DAILY AS NEEDED FOR PAIN      amLODIPine (NORVASC) 5 mg tablet TAKE 1 TABLET BY MOUTH EVERY DAY 90 Tab 3    guaiFENesin ER (MUCINEX) 600 mg ER tablet Take 1 Tab by mouth two (2) times a day. 30 Tab 0    apixaban (ELIQUIS) 5 mg tablet Take 5 mg by mouth daily.  metoprolol tartrate (LOPRESSOR) 100 mg IR tablet Take 100 mg by mouth daily.  albuterol (PROAIR HFA) 90 mcg/actuation inhaler Take 2 Puffs by inhalation every four (4) hours as needed for Wheezing.  albuterol-ipratropium (DUO-NEB) 2.5 mg-0.5 mg/3 ml nebu 3 mL by Nebulization route.        No Known Allergies  Social History     Socioeconomic History    Marital status:      Spouse name: Not on file    Number of children: Not on file    Years of education: Not on file    Highest education level: Not on file   Occupational History    Not on file   Tobacco Use    Smoking status: Never Smoker    Smokeless tobacco: Never Used   Substance and Sexual Activity    Alcohol use: No    Drug use: No    Sexual activity: Not on file   Other Topics Concern    Not on file   Social History Narrative    Not on file     Social Determinants of Health Financial Resource Strain:     Difficulty of Paying Living Expenses:    Food Insecurity:     Worried About Running Out of Food in the Last Year:     [address] in the Last Year:    Transportation Needs:     Lack of Transportation (Medical):  Lack of Transportation (Non-Medical):    Physical Activity:     Days of Exercise per Week:     Minutes of Exercise per Session:    Stress:     Feeling of Stress :    Social Connections:     Frequency of Communication with Friends and Family:     Frequency of Social Gatherings with Friends and Family:     Attends [employer] Services:     Active Member of Clubs or Organizations:     Attends Club or Organization Meetings:     Marital Status:    Intimate Partner Violence:     Fear of Current or Ex-Partner:     Emotionally Abused:     Physically Abused:     Sexually Abused:        Objective:     Visit Vitals  BP (!) 128/93 (BP 1 Location: Left upper arm, BP Patient Position: Sitting, BP Cuff Size: Large adult)   Pulse 79   Temp 97.1 °F (36.2 °C) (Temporal)   Resp 18   Ht 6' 3\" (1.905 m)   Wt 258 lb (117 kg)   SpO2 96%   BMI 32.25 kg/m²       General: Alert and oriented and in no acute distress. Responds to all questions appropriately. LUNGS: Respirations unlabored.   MSK:        Gait: poor, heel strike, initial contact with foot flat, loading on lateral aspect of the feet, genu recurvatum on the left, hyperlordosis of the lumbar spine    Inspection: bilateral pes cavus     Palpation:    L1-L5: No tenderness    Sacrum: No tenderness    Coccyx: No tenderness    Left Paraspinal: No tenderness    Right Paraspinal: No tenderness  Mild soreness at l5-s1 junction     Strength (0-5/5)    Hip Flexion:   Left: 4/5  Right: 5/5    Hip Extension:  Left: 4/5  Right: 5/5    Hip Abduction:  Left: 4-/5  Right: 4-/5    Knee Extension:  Left: 4+/5  Right: 5/5    Knee Flexion:   Left: 5/5  Right: 5/5    Ankle dorsiflexion:  Left: 4-/5  Right: 4-/5    Ankle plantarflexion: Left: 5/5  Right: 5/5    Ankle Eversion:           0/5 bilaterally     Sensation: Intact, no deficits in the l2-S1 dermatomes    No clonus bilaterally  Modified Devang Scale for spasticity: No spasticity detected in the lower extremities     Special test:    Straight leg: Left: Negative  Right: Negative        Assessment:       ICD-10-CM ICD-9-CM    1. Lumbar pain  M54.5 724.2    2. Altered gait  R26.9 781.2    3. Mechanical low back pain  M54.5 724.2          Plan:   1. Patient's back pain appears to be mechanical likely secondary to hyperlordotic posture/altered gait. 2. Patient unsure of medication however diclofenac mentioned on chart. Advised to avoid use of diclofenac as he is on eliquis. 3. Re-enforced that patient should follow with neurology for altered gait mechanics for workup for neuromuscular disorder possible EMG    Medications:    1.  . Acetaminophen (Tylenol):  500mg 1-2 tablets every 6 hours as needed for pain.

## 2022-03-18 PROBLEM — R73.03 PREDIABETES: Status: ACTIVE | Noted: 2018-04-24

## 2022-03-19 PROBLEM — I10 ESSENTIAL HYPERTENSION: Status: ACTIVE | Noted: 2018-04-20

## 2022-03-19 PROBLEM — R25.1 TREMOR OF BOTH HANDS: Status: ACTIVE | Noted: 2018-08-09

## 2022-03-19 PROBLEM — I48.20 CHRONIC ATRIAL FIBRILLATION (HCC): Status: ACTIVE | Noted: 2018-04-20

## 2022-03-19 PROBLEM — E78.49 OTHER HYPERLIPIDEMIA: Status: ACTIVE | Noted: 2018-04-20

## 2022-03-19 PROBLEM — C44.311 BASAL CELL CARCINOMA OF SKIN OF NOSE: Status: ACTIVE | Noted: 2019-03-21

## 2023-05-22 RX ORDER — ATORVASTATIN CALCIUM 10 MG/1
1 TABLET, FILM COATED ORAL DAILY
COMMUNITY
Start: 2021-08-12

## 2023-05-22 RX ORDER — GUAIFENESIN 600 MG/1
600 TABLET, EXTENDED RELEASE ORAL 2 TIMES DAILY
COMMUNITY
Start: 2019-09-20

## 2023-05-22 RX ORDER — AMLODIPINE BESYLATE 5 MG/1
1 TABLET ORAL DAILY
COMMUNITY
Start: 2021-03-18

## 2023-05-22 RX ORDER — ALBUTEROL SULFATE 90 UG/1
2 AEROSOL, METERED RESPIRATORY (INHALATION) EVERY 4 HOURS PRN
COMMUNITY

## 2023-05-22 RX ORDER — IPRATROPIUM BROMIDE AND ALBUTEROL SULFATE 2.5; .5 MG/3ML; MG/3ML
3 SOLUTION RESPIRATORY (INHALATION)
COMMUNITY

## 2023-05-22 RX ORDER — DICLOFENAC SODIUM 75 MG/1
1 TABLET, DELAYED RELEASE ORAL 2 TIMES DAILY PRN
COMMUNITY
Start: 2021-08-02

## 2023-05-22 RX ORDER — METOPROLOL TARTRATE 100 MG/1
100 TABLET ORAL DAILY
COMMUNITY